# Patient Record
Sex: MALE | Race: BLACK OR AFRICAN AMERICAN | Employment: FULL TIME | URBAN - METROPOLITAN AREA
[De-identification: names, ages, dates, MRNs, and addresses within clinical notes are randomized per-mention and may not be internally consistent; named-entity substitution may affect disease eponyms.]

---

## 2018-11-14 ENCOUNTER — TELEPHONE (OUTPATIENT)
Dept: SURGERY | Facility: CLINIC | Age: 43
End: 2018-11-14

## 2018-11-14 NOTE — TELEPHONE ENCOUNTER
"[facility] Call Center    Phone Message    May a detailed message be left on voicemail: yes    Reason for Call: Other: Patient called in as his fistula \"isnt right\".  He is concerned about fluid being backed up and causing problems like in the past.  He thinks he needs another surgery to correct it.  Please call asap as he wants to be seen asap.      Action Taken: Message routed to:  [facility] ([facility]): brenda    "

## 2018-11-16 NOTE — TELEPHONE ENCOUNTER
Patient states back in 2013 he had a Seton placed and now it has broken and is falling out. Patient states there is still drainage coming out of the Seton but the Seton doesn't feel secure anymore. Patient states there does not appear to be any drainage build up around the seton area. Patient states last time his Seton fell out he got an infection relatively quickly. Patient states he is in [place] now but is on his way home now. Patient will be back and able to come in on Tuesday. Patient was given an appointment on Tuesday 11/20 at 2:00 pm. Patient stated understanding of appointment date, time, and location. Patient is in agreement with this plan of care. Patient's questions and concerns were addressed to his stated satisfaction. Patient will callback directly with further questions or concerns.

## 2018-11-20 ENCOUNTER — OFFICE VISIT (OUTPATIENT)
Dept: SURGERY | Facility: CLINIC | Age: 43
End: 2018-11-20
Payer: OTHER MISCELLANEOUS

## 2018-11-20 VITALS
SYSTOLIC BLOOD PRESSURE: 141 MMHG | WEIGHT: 221.7 LBS | HEART RATE: 70 BPM | TEMPERATURE: 99.3 F | OXYGEN SATURATION: 100 % | DIASTOLIC BLOOD PRESSURE: 91 MMHG | BODY MASS INDEX: 32.84 KG/M2 | HEIGHT: 69 IN

## 2018-11-20 DIAGNOSIS — K60.30 ANAL FISTULA: Primary | ICD-10-CM

## 2018-11-20 NOTE — MR AVS SNAPSHOT
"              After Visit Summary   2018    Herson Barlow    MRN: [id number]           Patient Information     Date Of Birth          1975        Visit Information        Provider Department      2018 2:00 PM Althea Boateng, MARIANGEL CNP [facility] Colon and Rectal Surgery        Today's Diagnoses     Anal fistula    -  1       Follow-ups after your visit        Who to contact     Please call your clinic at [phone] to:    Ask questions about your health    Make or cancel appointments    Discuss your medicines    Learn about your test results    Speak to your doctor            Additional Information About Your Visit        MyChart Information     DrFirst is an electronic gateway that provides easy, online access to your medical records. With DrFirst, you can request a clinic appointment, read your test results, renew a prescription or communicate with your care team.     To sign up for DrFirst visit the website at [email or web address]   You will be asked to enter the access code listed below, as well as some personal information. Please follow the directions to create your username and password.     Your access code is: [email or web address]  Expires: 2019  6:30 AM     Your access code will  in 90 days. If you need help or a new code, please contact your [facility] or call [phone] for assistance.        Care EveryWhere ID     This is your Care EveryWhere ID. This could be used by other organizations to access your [facility] medical records  [id number]        Your Vitals Were     Pulse Temperature Height Pulse Oximetry BMI (Body Mass Index)       70 99.3  F (37.4  C) (Oral) 5' 9\" 100% 32.74 kg/m2        Blood Pressure from Last 3 Encounters:   18 (!) 141/91   13 138/87   01/15/13 134/86    Weight from Last 3 Encounters:   18 221 lb 11.2 oz   13 214 lb 15.2 oz   01/15/13 210 lb              We Performed the " Following     ANOSCOPY W/WO BRUSH/WASH        Primary Care Provider Office Phone # Fax #    [facility] [phone]       [address] [place] [address]        Equal Access to Services     KULWANT DANGELO : Hadii aad ku hadjosh Soarthur, waaxda luqadaha, qaybta kaalmada adelexie, radha hudson su joyce. So [facility] [phone].    ATENCIÓN: Si habla español, tiene a diaz disposición servicios gratuitos de asistencia lingüística. Llame al [phone].    We comply with applicable federal civil rights laws and [place] laws. We do not discriminate on the basis of race, color, national origin, age, disability, sex, sexual orientation, or gender identity.            Thank you!     Thank you for choosing [facility] COLON AND RECTAL SURGERY  for your care. Our goal is always to provide you with excellent care. Hearing back from our patients is one way we can continue to improve our services. Please take a few minutes to complete the written survey that you may receive in the mail after your visit with us. Thank you!             Your Updated Medication List - Protect others around you: Learn how to safely use, store and throw away your medicines at www.disposemymeds.org.          This list is accurate as of 11/20/18  3:43 PM.  Always use your most recent med list.                   Brand Name Dispense Instructions for use Diagnosis    ibuprofen 600 MG tablet    ADVIL/MOTRIN    50 tablet    Take 1 tablet by mouth every 6 hours as needed for other (For mild pain or temperature greater than 102F).    Anal fistula       lidocaine (viscous) 2 % solution    XYLOCAINE    100 mL    Take 3 mLs by mouth every 4 hours as needed for pain (apply to anus for pain.). swish and spit every 3-8 hours as needed; max 8 doses/24 hour period    Anal fistula       NO ACTIVE MEDICATIONS           oxyCODONE-acetaminophen 5-325 MG per tablet    PERCOCET    30 tablet    Take 1-2 tablets by  mouth every 4 hours as needed for pain.    Anal fistula       senna-docusate 8.6-50 MG per tablet    SENOKOT-S;PERICOLACE    30 tablet    Take 1-2 tablets by mouth 2 times daily as needed for constipation.    Anal fistula

## 2018-11-20 NOTE — PROGRESS NOTES
Colon and Rectal Surgery Consult Clinic Note    Date: 2018     Referring provider:  Althea Boateng, MARIANGEL CNP  [address]  [place] [address]     RE: Herson Barlow  : 1975  MELISSA: 2018    Herson Barlow is a very pleasant 43 year old male presents today for broken seton.    Herson developed a severe perianal sepsis drained by Dr. Olvera in .  This reoccurred in 2012 and an MRI at that time showed a complex left-sided fistula.  It he subsequently underwent examination under anesthesia with fistulotomy and seton placement on 2013 with Dr. Moreland.  He reports that he had been doing well.  He has never returned to clinic for any recheck as he has been fairly asymptomatic.  However, he was in [place] over the weekend and his seton broke.  He believes it is still in place but would like this replaced.  He continues to have drainage around his seton and this has not changed.  He denies any pain.  He denies any fevers or chills.    Assessment/Plan: On exam seton appears very fragile and has broke but is still in place in the fistula tract.  I attempted to replace the seton in clinic.  However, when the threading the new seton through the tract, the old seton broke again so I was unable to replace the seton.  He will require examination under anesthesia for placement.  However, discussed that if he develops any increasing pain before he is able to get into surgery to have this replaced, to notify the clinic right away.  He has never returned to discuss if a fistulotomy is possible, but states that he is not interested in having any procedures for the fistula tract and would prefer to leave a seton in place as this does not bother him.  I recommended that he return to clinic about once a year to have this checked and retied or replaced if needed to try to avoid further setons breaking. Patient's questions were answered to his stated satisfaction  and he is in agreement with this plan.    Medical history:  Past Medical History:   Diagnosis Date     External hemorrhoids with complication      Tobacco use disorder     Smokes 1 to 2 cigs/day. Started at 26 years       Surgical history:  Past Surgical History:   Procedure Laterality Date     FISTULOTOMY RECTUM  1/23/2013    Procedure: FISTULOTOMY RECTUM;  Rectal Exam, Fistulotomy, Seton Placement;  Surgeon: Bird Moreland MD;  Location: [facility] OR     HC HEMORRHOIDECTOMY,INT/EXT,COMPLX  9/21/09     HEMORRHOID SURGERY  2009     INCISION AND DRAINAGE PERINEAL, COMBINED  6/29/2012    Procedure: COMBINED INCISION AND DRAINAGE PERINEAL;  Incision and drainage of buttocks wound with wash out  of buttocks wound;  Surgeon: Jessica Olvera MD;  Location: [facility] OR     INCISION AND DRAINAGE RECTUM, COMBINED  12/25/2012    Procedure: COMBINED INCISION AND DRAINAGE RECTUM;  Exam Under Anesthesia, drainage of deep post anal abcess;  Surgeon: Bird Moreland MD;  Location: [facility] OR     IRRIGATION AND DEBRIDEMENT RECTUM, COMBINED  6/27/2012    Procedure: COMBINED IRRIGATION AND DEBRIDEMENT RECTUM;  Irrigation and Debridement Rectal Abscess;  Surgeon: Jessica Olvera MD;  Location: [facility] OR       Problem list:  Patient Active Problem List    Diagnosis Date Noted     Perirectal abscess 06/27/2012     Priority: Medium     CARDIOVASCULAR SCREENING; LDL GOAL LESS THAN 160 05/09/2010     Priority: Medium     External hemorrhoids with complication      Priority: Medium     Tobacco use disorder      Priority: Medium     Smokes 6 to 7 cigs/day. Started at 26 years         Medications:  Current Outpatient Prescriptions   Medication Sig Dispense Refill     ibuprofen (ADVIL,MOTRIN) 600 MG tablet Take 1 tablet by mouth every 6 hours as needed for other (For mild pain or temperature greater than 102F). 50 tablet 0     lidocaine (XYLOCAINE) 2 % solution Take 3 mLs by mouth every 4 hours as needed for pain (apply to anus for pain.).  "swish and spit every 3-8 hours as needed; max 8 doses/24 hour period (Patient not taking: Reported on 11/20/2018) 100 mL 0     NO ACTIVE MEDICATIONS        oxyCODONE-acetaminophen (PERCOCET) 5-325 MG per tablet Take 1-2 tablets by mouth every 4 hours as needed for pain. (Patient not taking: Reported on 11/20/2018) 30 tablet 0     senna-docusate (SENOKOT-S;PERICOLACE) 8.6-50 MG per tablet Take 1-2 tablets by mouth 2 times daily as needed for constipation. (Patient not taking: Reported on 11/20/2018) 30 tablet 0       Allergies:  Allergies   Allergen Reactions     Ivp Dye [Contrast Dye]        Family history:  Family History   Problem Relation Age of Onset     Diabetes Brother      Diabetes Brother        Social history:  Social History   Substance Use Topics     Smoking status: Light Tobacco Smoker     Types: Cigarettes     Smokeless tobacco: Never Used      Comment: 1 to 2  cigarettes/day. 2nd hand smoke exposure from roommates.     Alcohol use No    Marital status: single.    Nursing Notes:   Veronica Velazquez CMA  11/20/2018  1:31 PM  Signed  Chief Complaint   Patient presents with     Consult     Rectal Problem     RECHECK       Vitals:    11/20/18 1327   BP: (!) 141/91   BP Location: Left arm   Patient Position: Sitting   Cuff Size: Adult Regular   Pulse: 70   Temp: 99.3  F (37.4  C)   TempSrc: Oral   SpO2: 100%   Weight: 221 lb 11.2 oz   Height: 5' 9\"       Body mass index is 32.74 kg/(m^2).      DENIZ Cadena                         Physical Examination: Exam was chaperoned by DENIZ Cadena   BP (!) 141/91 (BP Location: Left arm, Patient Position: Sitting, Cuff Size: Adult Regular)  Pulse 70  Temp 99.3  F (37.4  C) (Oral)  Ht 5' 9\"  Wt 221 lb 11.2 oz  SpO2 100%  BMI 32.74 kg/m2  General: Alert, oriented, in no acute distress, sitting comfortably  HEENT: Mucous membranes moist  Perianal external examination:  Yellow vessel loop seton in place with external opening in the posterior " midline. This broke but is still in place. Attempted to replace seton but old seton broke while rotating through the tract.  Digital rectal examination: Was performed.   Sphincter tone: Good.  Palpable lesions: No.  Prostate: Normal.  Other: None.    Anoscopy: Was performed.   Hemorrhoids: No significant internal hemorrhoids.  Lesions: internal fistula opening in the posterior midline    Total face to face time was 20 minutes, >50% counseling.    MARIANGEL Bess, NP-C  Colon and Rectal Surgery   [facility]    This note was created using speech recognition software and may contain unintended word substitutions.

## 2018-11-20 NOTE — NURSING NOTE
"Chief Complaint   Patient presents with     Consult     Rectal Problem     RECHECK       Vitals:    11/20/18 1327   BP: (!) 141/91   BP Location: Left arm   Patient Position: Sitting   Cuff Size: Adult Regular   Pulse: 70   Temp: 99.3  F (37.4  C)   TempSrc: Oral   SpO2: 100%   Weight: 221 lb 11.2 oz   Height: 5' 9\"       Body mass index is 32.74 kg/(m^2).      Veronica Velazquez, EMT                      "

## 2018-11-20 NOTE — LETTER
2018       RE: Herson Barlow  [address]  [facility] [address]     Dear Colleague,    Thank you for referring your patient, Herson Barlow, to the [facility] COLON AND RECTAL SURGERY at [facility] Please see a copy of my visit note below.    Colon and Rectal Surgery Consult Clinic Note    Date: 2018     Referring provider:  MARIANGEL Sal CNP  [address] [facility] [place] [address]     RE: Herson Barlow  : 1975  MELISSA: 2018    Herson Barlow is a very pleasant 43 year old male presents today for broken seton.    Herson developed a severe perianal sepsis drained by Dr. Olvera in .  This reoccurred in 2012 and an MRI at that time showed a complex left-sided fistula.  It he subsequently underwent examination under anesthesia with fistulotomy and seton placement on 2013 with Dr. Moreland.  He reports that he had been doing well.  He has never returned to clinic for any recheck as he has been fairly asymptomatic.  However, he was in [place] over the weekend and his seton broke.  He believes it is still in place but would like this replaced.  He continues to have drainage around his seton and this has not changed.  He denies any pain.  He denies any fevers or chills.    Assessment/Plan: On exam seton appears very fragile and has broke but is still in place in the fistula tract.  I attempted to replace the seton in clinic.  However, when the threading the new seton through the tract, the old seton broke again so I was unable to replace the seton.  He will require examination under anesthesia for placement.  However, discussed that if he develops any increasing pain before he is able to get into surgery to have this replaced, to notify the clinic right away.  He has never returned to discuss if a fistulotomy is possible, but states that he is not interested in having any procedures for the  fistula tract and would prefer to leave a seton in place as this does not bother him.  I recommended that he return to clinic about once a year to have this checked and retied or replaced if needed to try to avoid further setons breaking. Patient's questions were answered to his stated satisfaction and he is in agreement with this plan.    Medical history:  Past Medical History:   Diagnosis Date     External hemorrhoids with complication      Tobacco use disorder     Smokes 1 to 2 cigs/day. Started at 26 years       Surgical history:  Past Surgical History:   Procedure Laterality Date     FISTULOTOMY RECTUM  1/23/2013    Procedure: FISTULOTOMY RECTUM;  Rectal Exam, Fistulotomy, Seton Placement;  Surgeon: Bird Moreland MD;  Location: [facility]     HC HEMORRHOIDECTOMY,INT/EXT,COMPLX  9/21/09     HEMORRHOID SURGERY  2009     INCISION AND DRAINAGE PERINEAL, COMBINED  6/29/2012    Procedure: COMBINED INCISION AND DRAINAGE PERINEAL;  Incision and drainage of buttocks wound with wash out  of buttocks wound;  Surgeon: Jessica Olvera MD;  Location: [facility] OR     INCISION AND DRAINAGE RECTUM, COMBINED  12/25/2012    Procedure: COMBINED INCISION AND DRAINAGE RECTUM;  Exam Under Anesthesia, drainage of deep post anal abcess;  Surgeon: Bird Moreland MD;  Location: [facility] OR     IRRIGATION AND DEBRIDEMENT RECTUM, COMBINED  6/27/2012    Procedure: COMBINED IRRIGATION AND DEBRIDEMENT RECTUM;  Irrigation and Debridement Rectal Abscess;  Surgeon: Jessica Olvera MD;  [facility]       Problem list:  Patient Active Problem List    Diagnosis Date Noted     Perirectal abscess 06/27/2012     Priority: Medium     CARDIOVASCULAR SCREENING; LDL GOAL LESS THAN 160 05/09/2010     Priority: Medium     External hemorrhoids with complication      Priority: Medium     Tobacco use disorder      Priority: Medium     Smokes 6 to 7 cigs/day. Started at 26 years         Medications:  Current Outpatient Prescriptions   Medication Sig  "Dispense Refill     ibuprofen (ADVIL,MOTRIN) 600 MG tablet Take 1 tablet by mouth every 6 hours as needed for other (For mild pain or temperature greater than 102F). 50 tablet 0     lidocaine (XYLOCAINE) 2 % solution Take 3 mLs by mouth every 4 hours as needed for pain (apply to anus for pain.). swish and spit every 3-8 hours as needed; max 8 doses/24 hour period (Patient not taking: Reported on 11/20/2018) 100 mL 0     NO ACTIVE MEDICATIONS        oxyCODONE-acetaminophen (PERCOCET) 5-325 MG per tablet Take 1-2 tablets by mouth every 4 hours as needed for pain. (Patient not taking: Reported on 11/20/2018) 30 tablet 0     senna-docusate (SENOKOT-S;PERICOLACE) 8.6-50 MG per tablet Take 1-2 tablets by mouth 2 times daily as needed for constipation. (Patient not taking: Reported on 11/20/2018) 30 tablet 0       Allergies:  Allergies   Allergen Reactions     Ivp Dye [Contrast Dye]        Family history:  Family History   Problem Relation Age of Onset     Diabetes Brother      Diabetes Brother        Social history:  Social History   Substance Use Topics     Smoking status: Light Tobacco Smoker     Types: Cigarettes     Smokeless tobacco: Never Used      Comment: 1 to 2  cigarettes/day. 2nd hand smoke exposure from roommates.     Alcohol use No    Marital status: single.    Nursing Notes:   Veronica Velazquez CMA  11/20/2018  1:31 PM  Signed  Chief Complaint   Patient presents with     Consult     Rectal Problem     RECHECK       Vitals:    11/20/18 1327   BP: (!) 141/91   BP Location: Left arm   Patient Position: Sitting   Cuff Size: Adult Regular   Pulse: 70   Temp: 99.3  F (37.4  C)   TempSrc: Oral   SpO2: 100%   Weight: 221 lb 11.2 oz   Height: 5' 9\"       Body mass index is 32.74 kg/(m^2).      DENIZ Cadena         Physical Examination: Exam was chaperoned by DENIZ Cadena   BP (!) 141/91 (BP Location: Left arm, Patient Position: Sitting, Cuff Size: Adult Regular)  Pulse 70  Temp 99.3  F (37.4  C) " "(Oral)  Ht 5' 9\"  Wt 221 lb 11.2 oz  SpO2 100%  BMI 32.74 kg/m2  General: Alert, oriented, in no acute distress, sitting comfortably  HEENT: Mucous membranes moist  Perianal external examination:  Yellow vessel loop seton in place with external opening in the posterior midline. This broke but is still in place. Attempted to replace seton but old seton broke while rotating through the tract.  Digital rectal examination: Was performed.   Sphincter tone: Good.  Palpable lesions: No.  Prostate: Normal.  Other: None.    Anoscopy: Was performed.   Hemorrhoids: No significant internal hemorrhoids.  Lesions: internal fistula opening in the posterior midline    Total face to face time was 20 minutes, >50% counseling.    This note was created using speech recognition software and may contain unintended word substitutions.      MARIANGEL Urena CNP      "

## 2018-11-21 ENCOUNTER — TELEPHONE (OUTPATIENT)
Dept: SURGERY | Facility: CLINIC | Age: 43
End: 2018-11-21

## 2018-11-21 DIAGNOSIS — K60.30 ANAL FISTULA: Primary | ICD-10-CM

## 2018-11-21 NOTE — TELEPHONE ENCOUNTER
----- Message from MARIANGEL Sal CNP sent at 11/20/2018  3:38 PM CST -----  Please set him up for examination under anesthesia and seton placement. Thanks!  E

## 2018-11-21 NOTE — TELEPHONE ENCOUNTER
Patient left a message returning my call.  Informed patient that I just had a cancellation for 12/7/18, and patient confirms he would like that date for procedure.    Patient is scheduled for surgery with Dr. Moreland      Date of Surgery: 12/7/18    [facility]  [address] [place] [id number]    Informed patient they will need an adult  Yes    Pre-op with surgeon (if applicable): n/a    H&P: Scheduled with local physician    Surgery packet: email

## 2018-11-21 NOTE — TELEPHONE ENCOUNTER
Called patient to schedule OR procedure.  Left message offering 12/13/18 versus 12/21/18.  Requested a call back to confirm. Provided my direct number.

## 2018-12-06 ENCOUNTER — ANESTHESIA EVENT (OUTPATIENT)
Dept: SURGERY | Facility: AMBULATORY SURGERY CENTER | Age: 43
End: 2018-12-06

## 2018-12-07 ENCOUNTER — HOSPITAL ENCOUNTER (OUTPATIENT)
Facility: AMBULATORY SURGERY CENTER | Age: 43
End: 2018-12-07
Attending: COLON & RECTAL SURGERY
Payer: COMMERCIAL

## 2018-12-07 ENCOUNTER — ANESTHESIA (OUTPATIENT)
Dept: SURGERY | Facility: AMBULATORY SURGERY CENTER | Age: 43
End: 2018-12-07

## 2018-12-07 VITALS
OXYGEN SATURATION: 95 % | HEART RATE: 96 BPM | SYSTOLIC BLOOD PRESSURE: 105 MMHG | DIASTOLIC BLOOD PRESSURE: 74 MMHG | RESPIRATION RATE: 14 BRPM | TEMPERATURE: 98.5 F

## 2018-12-07 RX ORDER — DEXAMETHASONE SODIUM PHOSPHATE 4 MG/ML
INJECTION, SOLUTION INTRA-ARTICULAR; INTRALESIONAL; INTRAMUSCULAR; INTRAVENOUS; SOFT TISSUE PRN
Status: DISCONTINUED | OUTPATIENT
Start: 2018-12-07 | End: 2018-12-07

## 2018-12-07 RX ORDER — GLYCOPYRROLATE 0.2 MG/ML
INJECTION, SOLUTION INTRAMUSCULAR; INTRAVENOUS PRN
Status: DISCONTINUED | OUTPATIENT
Start: 2018-12-07 | End: 2018-12-07

## 2018-12-07 RX ORDER — PROPOFOL 10 MG/ML
INJECTION, EMULSION INTRAVENOUS CONTINUOUS PRN
Status: DISCONTINUED | OUTPATIENT
Start: 2018-12-07 | End: 2018-12-07

## 2018-12-07 RX ORDER — SODIUM CHLORIDE, SODIUM LACTATE, POTASSIUM CHLORIDE, CALCIUM CHLORIDE 600; 310; 30; 20 MG/100ML; MG/100ML; MG/100ML; MG/100ML
INJECTION, SOLUTION INTRAVENOUS CONTINUOUS
Status: DISCONTINUED | OUTPATIENT
Start: 2018-12-07 | End: 2018-12-08 | Stop reason: HOSPADM

## 2018-12-07 RX ORDER — GABAPENTIN 300 MG/1
300 CAPSULE ORAL ONCE
Status: COMPLETED | OUTPATIENT
Start: 2018-12-07 | End: 2018-12-07

## 2018-12-07 RX ORDER — HYDROMORPHONE HYDROCHLORIDE 1 MG/ML
.3-.5 INJECTION, SOLUTION INTRAMUSCULAR; INTRAVENOUS; SUBCUTANEOUS EVERY 10 MIN PRN
Status: DISCONTINUED | OUTPATIENT
Start: 2018-12-07 | End: 2018-12-08 | Stop reason: HOSPADM

## 2018-12-07 RX ORDER — FENTANYL CITRATE 50 UG/ML
INJECTION, SOLUTION INTRAMUSCULAR; INTRAVENOUS PRN
Status: DISCONTINUED | OUTPATIENT
Start: 2018-12-07 | End: 2018-12-07

## 2018-12-07 RX ORDER — ONDANSETRON 4 MG/1
4 TABLET, ORALLY DISINTEGRATING ORAL
Status: CANCELLED | OUTPATIENT
Start: 2018-12-07

## 2018-12-07 RX ORDER — KETOROLAC TROMETHAMINE 30 MG/ML
INJECTION, SOLUTION INTRAMUSCULAR; INTRAVENOUS PRN
Status: DISCONTINUED | OUTPATIENT
Start: 2018-12-07 | End: 2018-12-07

## 2018-12-07 RX ORDER — ONDANSETRON 4 MG/1
4 TABLET, ORALLY DISINTEGRATING ORAL EVERY 30 MIN PRN
Status: DISCONTINUED | OUTPATIENT
Start: 2018-12-07 | End: 2018-12-08 | Stop reason: HOSPADM

## 2018-12-07 RX ORDER — ONDANSETRON 2 MG/ML
INJECTION INTRAMUSCULAR; INTRAVENOUS PRN
Status: DISCONTINUED | OUTPATIENT
Start: 2018-12-07 | End: 2018-12-07

## 2018-12-07 RX ORDER — CEFAZOLIN SODIUM 1 G/3ML
INJECTION, POWDER, FOR SOLUTION INTRAMUSCULAR; INTRAVENOUS PRN
Status: DISCONTINUED | OUTPATIENT
Start: 2018-12-07 | End: 2018-12-07

## 2018-12-07 RX ORDER — NALOXONE HYDROCHLORIDE 0.4 MG/ML
.1-.4 INJECTION, SOLUTION INTRAMUSCULAR; INTRAVENOUS; SUBCUTANEOUS
Status: DISCONTINUED | OUTPATIENT
Start: 2018-12-07 | End: 2018-12-08 | Stop reason: HOSPADM

## 2018-12-07 RX ORDER — ONDANSETRON 2 MG/ML
4 INJECTION INTRAMUSCULAR; INTRAVENOUS EVERY 30 MIN PRN
Status: DISCONTINUED | OUTPATIENT
Start: 2018-12-07 | End: 2018-12-08 | Stop reason: HOSPADM

## 2018-12-07 RX ORDER — MEPERIDINE HYDROCHLORIDE 25 MG/ML
12.5 INJECTION INTRAMUSCULAR; INTRAVENOUS; SUBCUTANEOUS
Status: DISCONTINUED | OUTPATIENT
Start: 2018-12-07 | End: 2018-12-08 | Stop reason: HOSPADM

## 2018-12-07 RX ORDER — FENTANYL CITRATE 50 UG/ML
25-50 INJECTION, SOLUTION INTRAMUSCULAR; INTRAVENOUS
Status: DISCONTINUED | OUTPATIENT
Start: 2018-12-07 | End: 2018-12-08 | Stop reason: HOSPADM

## 2018-12-07 RX ORDER — BUPIVACAINE HYDROCHLORIDE AND EPINEPHRINE 2.5; 5 MG/ML; UG/ML
INJECTION, SOLUTION INFILTRATION; PERINEURAL PRN
Status: DISCONTINUED | OUTPATIENT
Start: 2018-12-07 | End: 2018-12-07 | Stop reason: HOSPADM

## 2018-12-07 RX ORDER — ACETAMINOPHEN 325 MG/1
975 TABLET ORAL ONCE
Status: COMPLETED | OUTPATIENT
Start: 2018-12-07 | End: 2018-12-07

## 2018-12-07 RX ORDER — OXYCODONE HYDROCHLORIDE 5 MG/1
5 TABLET ORAL EVERY 4 HOURS PRN
Status: DISCONTINUED | OUTPATIENT
Start: 2018-12-07 | End: 2018-12-08 | Stop reason: HOSPADM

## 2018-12-07 RX ORDER — LABETALOL HYDROCHLORIDE 5 MG/ML
INJECTION, SOLUTION INTRAVENOUS PRN
Status: DISCONTINUED | OUTPATIENT
Start: 2018-12-07 | End: 2018-12-07

## 2018-12-07 RX ADMIN — GABAPENTIN 300 MG: 300 CAPSULE ORAL at 10:53

## 2018-12-07 RX ADMIN — ACETAMINOPHEN 975 MG: 325 TABLET ORAL at 10:53

## 2018-12-07 RX ADMIN — KETOROLAC TROMETHAMINE 30 MG: 30 INJECTION, SOLUTION INTRAMUSCULAR; INTRAVENOUS at 13:01

## 2018-12-07 RX ADMIN — SODIUM CHLORIDE, SODIUM LACTATE, POTASSIUM CHLORIDE, CALCIUM CHLORIDE: 600; 310; 30; 20 INJECTION, SOLUTION INTRAVENOUS at 12:21

## 2018-12-07 RX ADMIN — FENTANYL CITRATE 50 MCG: 50 INJECTION, SOLUTION INTRAMUSCULAR; INTRAVENOUS at 12:32

## 2018-12-07 RX ADMIN — GLYCOPYRROLATE 0.2 MG: 0.2 INJECTION, SOLUTION INTRAMUSCULAR; INTRAVENOUS at 12:39

## 2018-12-07 RX ADMIN — ONDANSETRON 4 MG: 2 INJECTION INTRAMUSCULAR; INTRAVENOUS at 12:29

## 2018-12-07 RX ADMIN — DEXAMETHASONE SODIUM PHOSPHATE 4 MG: 4 INJECTION, SOLUTION INTRA-ARTICULAR; INTRALESIONAL; INTRAMUSCULAR; INTRAVENOUS; SOFT TISSUE at 12:29

## 2018-12-07 RX ADMIN — FENTANYL CITRATE 50 MCG: 50 INJECTION, SOLUTION INTRAMUSCULAR; INTRAVENOUS at 12:33

## 2018-12-07 RX ADMIN — CEFAZOLIN SODIUM 2 G: 1 INJECTION, POWDER, FOR SOLUTION INTRAMUSCULAR; INTRAVENOUS at 12:29

## 2018-12-07 RX ADMIN — LABETALOL HYDROCHLORIDE 5 MG: 5 INJECTION, SOLUTION INTRAVENOUS at 12:57

## 2018-12-07 RX ADMIN — PROPOFOL 100 MCG/KG/MIN: 10 INJECTION, EMULSION INTRAVENOUS at 12:25

## 2018-12-07 NOTE — ANESTHESIA CARE TRANSFER NOTE
Patient: Herson Barlow    Procedure(s):  EXAM UNDER ANESTHESIA ANUS  PLACEMENT OF SETON RECTUM    Diagnosis: Anal Fistula  Diagnosis Additional Information: No value filed.    Anesthesia Type:   MAC     Note:  Airway :Room Air  Patient transferred to:Phase II  Comments: Patient awake and breathing spont. VSS. No complaints of pain or nausea. Report to RNHandoff Report: Identifed the Patient, Identified the Reponsible Provider, Reviewed the pertinent medical history, Discussed the surgical course, Reviewed Intra-OP anesthesia mangement and issues during anesthesia, Set expectations for post-procedure period and Allowed opportunity for questions and acknowledgement of understanding      Vitals: (Last set prior to Anesthesia Care Transfer)    [facility] VITALS  12/7/2018 1238 - 12/7/2018 1313      12/7/2018             EKG: NSR                Electronically Signed By: MARIANGEL Pinon CRNA  December 7, 2018  1:13 PM

## 2018-12-07 NOTE — ANESTHESIA POSTPROCEDURE EVALUATION
Anesthesia POST Procedure Evaluation    Patient: Herson Barlow   MRN:     [id number] Gender:   male   Age:    43 year old :      1975        Preoperative Diagnosis: Anal Fistula   Procedure(s):  EXAM UNDER ANESTHESIA ANUS  PLACEMENT OF SETON RECTUM   Postop Comments: No value filed.       Anesthesia Type:  Not documented    Reportable Event: NO     PAIN: Uncomplicated   Sign Out status: Comfortable, Well controlled pain     PONV: No PONV   Sign Out status:  No Nausea or Vomiting     Neuro/Psych: Uneventful perioperative course   Sign Out Status: Preoperative baseline; Age appropriate mentation     Airway/Resp.: Uneventful perioperative course   Sign Out Status: Non labored breathing, age appropriate RR; Resp. Status within EXPECTED Parameters     CV: Uneventful perioperative course   Sign Out status: Appropriate BP and perfusion indices; Appropriate HR/Rhythm     Disposition:   Sign Out in:  Phase II  Disposition:  Home  Recovery Course: Uneventful  Follow-Up: Not required           Last Anesthesia Record Vitals:  CR[facility] VITALS  2018 1238 - 2018 1338      2018             EKG: NSR          Last PACU/Preop Vitals:  Vitals:    18 1035 18 1312 18 1327   BP: 144/89 100/71 105/74   Pulse:  96 96   Resp: 18 14 14   Temp: 36.8  C (98.2  F) 36.9  C (98.5  F) 36.9  C (98.5  F)   SpO2: 98% 95% 95%         Electronically Signed By: Maksim Pacheco MD, 2018, 2:21 PM

## 2018-12-07 NOTE — ANESTHESIA PREPROCEDURE EVALUATION
Anesthesia Pre-Procedure Evaluation    Patient: Herson Barlow   MRN:     [id number] Gender:   male   Age:    43 year old :      1975        Preoperative Diagnosis: Anal Fistula   Procedure(s):  EXAM UNDER ANESTHESIA ANUS  PLACEMENT OF SETON RECTUM     Past Medical History:   Diagnosis Date     External hemorrhoids with complication      Tobacco use disorder     Smokes 1 to 2 cigs/day. Started at 26 years      Past Surgical History:   Procedure Laterality Date     FISTULOTOMY RECTUM  2013    Procedure: FISTULOTOMY RECTUM;  Rectal Exam, Fistulotomy, Seton Placement;  Surgeon: Bird Moreland MD;  Location: [facility] OR     HC HEMORRHOIDECTOMY,INT/EXT,COMPLX  09     HEMORRHOID SURGERY  2009     INCISION AND DRAINAGE PERINEAL, COMBINED  2012    Procedure: COMBINED INCISION AND DRAINAGE PERINEAL;  Incision and drainage of buttocks wound with wash out  of buttocks wound;  Surgeon: Jessica Olvera MD;  Location: [facility] OR     INCISION AND DRAINAGE RECTUM, COMBINED  2012    Procedure: COMBINED INCISION AND DRAINAGE RECTUM;  Exam Under Anesthesia, drainage of deep post anal abcess;  Surgeon: Bird Moreland MD;  Location: [facility]     IRRIGATION AND DEBRIDEMENT RECTUM, COMBINED  2012    Procedure: COMBINED IRRIGATION AND DEBRIDEMENT RECTUM;  Irrigation and Debridement Rectal Abscess;  Surgeon: Jessica Olvera MD;  Location: [facility]               [facility] AN PHYSICAL EXAM    Lab Results   Component Value Date    WBC 24.5 (H) 2012    HGB 14.0 2012    HCT 40.2 2012     2012    .4 (H) 2012     2012    POTASSIUM 4.4 2012    CHLORIDE 107 2012    CO2 23 2012    BUN 9 2012    CR 0.93 2012     (H) 2012    ELOY 8.4 (L) 2012    PHOS 6.9 (H) 2012    MAG 2.6 (H) 2012    PTT 36 2012    INR 1.25 (H) 2012       Preop Vitals  BP Readings from Last 3 Encounters:   18  "144/89   11/20/18 (!) 141/91   01/23/13 138/87    Pulse Readings from Last 3 Encounters:   11/20/18 70   01/15/13 83   12/25/12 106      Resp Readings from Last 3 Encounters:   12/07/18 18   01/23/13 16   12/25/12 16    SpO2 Readings from Last 3 Encounters:   12/07/18 98%   11/20/18 100%   01/23/13 100%      Temp Readings from Last 1 Encounters:   12/07/18 36.8  C (98.2  F) (Oral)    Ht Readings from Last 1 Encounters:   11/20/18 1.753 m (5' 9\")      Wt Readings from Last 1 Encounters:   11/20/18 100.6 kg (221 lb 11.2 oz)    Estimated body mass index is 32.74 kg/(m^2) as calculated from the following:    Height as of 11/20/18: 1.753 m (5' 9\").    Weight as of 11/20/18: 100.6 kg (221 lb 11.2 oz).     LDA:  Peripheral IV 12/07/18 Left Hand (Active)   Site Assessment WDL 12/7/2018 11:00 AM   Line Status Infusing 12/7/2018 11:00 AM   Phlebitis Scale 0-->no symptoms 12/7/2018 11:00 AM   Infiltration Scale 0 12/7/2018 11:00 AM   Number of days:0       Open Drain Rectal  (Active)   Number of days:0            Assessment:   ASA SCORE: 2    NPO Status: > 6 hours since completed Solid Foods; > 2 hours since completed Clear Liquids   Documentation: H&P complete; Preop Testing complete; Consents complete   Proceeding: Proceed without further delay  Tobacco Use:  NO Active use of Tobacco/UNKNOWN Tobacco use status     Plan:   Anes. Type:  MAC   Pre-Induction: Midazolam IV   Induction:  IV (Standard)   Airway: Native Airway   Access/Monitoring: PIV   Maintenance: Propofol; IV   Emergence: Procedure Site   Logistics: Same Day Surgery     Postop Pain/Sedation Strategy:  Standard-Options: Opioids PRN     PONV Management:  Adult Risk Factors:, Non-Smoker, Postop Opioids  Prevention: Ondansetron     CONSENT: Direct conversation   Plan and risks discussed with: Patient                          ANESTHESIA PREOP EVALUATION    PROCEDURE: Procedure(s):  EXAM UNDER ANESTHESIA ANUS  PLACEMENT OF SETON RECTUM    HPI: Herson Barlow is a " 43 year old male who presents for above procedure.    PAST MEDICAL HISTORY:    Past Medical History:   Diagnosis Date     External hemorrhoids with complication      Tobacco use disorder     Smokes 1 to 2 cigs/day. Started at 26 years       PAST SURGICAL HISTORY:    Past Surgical History:   Procedure Laterality Date     FISTULOTOMY RECTUM  1/23/2013    Procedure: FISTULOTOMY RECTUM;  Rectal Exam, Fistulotomy, Seton Placement;  Surgeon: Bird Moreland MD;  Location: [facility]     HC HEMORRHOIDECTOMY,INT/EXT,COMPLX  9/21/09     HEMORRHOID SURGERY  2009     INCISION AND DRAINAGE PERINEAL, COMBINED  6/29/2012    Procedure: COMBINED INCISION AND DRAINAGE PERINEAL;  Incision and drainage of buttocks wound with wash out  of buttocks wound;  Surgeon: Jessica Olvera MD;  Location: [facility]     INCISION AND DRAINAGE RECTUM, COMBINED  12/25/2012    Procedure: COMBINED INCISION AND DRAINAGE RECTUM;  Exam Under Anesthesia, drainage of deep post anal abcess;  Surgeon: Bird Moreland MD;  Location: [facility]     IRRIGATION AND DEBRIDEMENT RECTUM, COMBINED  6/27/2012    Procedure: COMBINED IRRIGATION AND DEBRIDEMENT RECTUM;  Irrigation and Debridement Rectal Abscess;  Surgeon: Jessica Olvera MD;  Location: [facility]       PAST ANESTHESIA HISTORY:     No personal or family h/o anesthesia problems    SOCIAL HISTORY:       Social History   Substance Use Topics     Smoking status: Light Tobacco Smoker     Types: Cigarettes     Smokeless tobacco: Never Used      Comment: 1 to 2  cigarettes/day. 2nd hand smoke exposure from roommates.     Alcohol use No       ALLERGIES:     Allergies   Allergen Reactions     Ivp Dye [Contrast Dye]        MEDICATIONS:       (Not in a hospital admission)    Current Outpatient Prescriptions   Medication Sig Dispense Refill     senna-docusate (SENOKOT-S;PERICOLACE) 8.6-50 MG per tablet Take 1-2 tablets by mouth 2 times daily as needed for constipation. 30 tablet 0     ibuprofen (ADVIL,MOTRIN) 600  MG tablet Take 1 tablet by mouth every 6 hours as needed for other (For mild pain or temperature greater than 102F). 50 tablet 0     lidocaine (XYLOCAINE) 2 % solution Take 3 mLs by mouth every 4 hours as needed for pain (apply to anus for pain.). swish and spit every 3-8 hours as needed; max 8 doses/24 hour period (Patient not taking: Reported on 11/20/2018) 100 mL 0     NO ACTIVE MEDICATIONS        oxyCODONE-acetaminophen (PERCOCET) 5-325 MG per tablet Take 1-2 tablets by mouth every 4 hours as needed for pain. (Patient not taking: Reported on 11/20/2018) 30 tablet 0       Current Outpatient Prescriptions Ordered in [facility]   Medication Sig Dispense Refill     senna-docusate (SENOKOT-S;PERICOLACE) 8.6-50 MG per tablet Take 1-2 tablets by mouth 2 times daily as needed for constipation. 30 tablet 0     ibuprofen (ADVIL,MOTRIN) 600 MG tablet Take 1 tablet by mouth every 6 hours as needed for other (For mild pain or temperature greater than 102F). 50 tablet 0     lidocaine (XYLOCAINE) 2 % solution Take 3 mLs by mouth every 4 hours as needed for pain (apply to anus for pain.). swish and spit every 3-8 hours as needed; max 8 doses/24 hour period (Patient not taking: Reported on 11/20/2018) 100 mL 0     NO ACTIVE MEDICATIONS        oxyCODONE-acetaminophen (PERCOCET) 5-325 MG per tablet Take 1-2 tablets by mouth every 4 hours as needed for pain. (Patient not taking: Reported on 11/20/2018) 30 tablet 0     Current Facility-Administered Medications Ordered in Epic   Medication Dose Route Frequency Provider Last Rate Last Dose     ceFAZolin (ANCEF) 1 g vial to attach to  ml bag for ADULT or 50 ml bag for PEDS    PRN Adele Mendez APRN CRNA   2 g at 12/07/18 1229     dexamethasone (DECADRON) injection   Intravenous PRN Adele Mendez APRN CRNA   4 mg at 12/07/18 1229     fentaNYL (PF) (SUBLIMAZE) injection   Intravenous PRN Adele Mendez APRN CRNA   50 mcg at 12/07/18 1233     glycopyrrolate (ROBINUL)  injection    PRN Adele Mendez APRN CRNA   0.2 mg at 12/07/18 1239     ketorolac (TORADOL) injection    PRN Adele Mendez APRN CRNA   30 mg at 12/07/18 1301     labetalol (NORMODYNE/TRANDATE) injection    PRN Adele Mendez APRN CRNA   5 mg at 12/07/18 1257     lactated ringers infusion   Intravenous Continuous Maksim Pacheco MD         lidocaine BUFFERED 1 % injection 1 mL  1 mL Other Q1H PRN Maksim Pacheco MD         midazolam (VERSED) injection   Intravenous PRN Adele Mendez APRN CRNA   2 mg at 12/07/18 1221     ondansetron (ZOFRAN) injection   Intravenous PRN Adele Mendez APRN CRNA   4 mg at 12/07/18 1229     PRE OP antibiotics NOT needed for this surgical procedure  1 each As instructed Continuous Bird Moreland MD         propofol (DIPRIVAN) injection 10 mg/mL vial   Intravenous Continuous PRN Adele Mendez APRN CRNA   Stopped at 12/07/18 1300     sodium chloride (PF) 0.9% PF flush 3 mL  3 mL Intracatheter Q1H PRN Maksim Pacheco MD           PHYSICAL EXAM:    Vitals: T 98.2, P Data Unavailable, /89, R 18, SpO2 98%, Weight   Wt Readings from Last 2 Encounters:   11/20/18 100.6 kg (221 lb 11.2 oz)   01/23/13 97.5 kg (214 lb 15.2 oz)       See doc flowsheet      NPO STATUS: see doc flowsheet    LABS:    BMP:  Recent Labs   Lab Test  12/25/12   1001   NA  141   POTASSIUM  4.4   CHLORIDE  107   CO2  23   BUN  9   CR  0.93   GLC  112*   ELOY  8.4*       LFTs:   No results for input(s): PROTTOTAL, ALBUMIN, BILITOTAL, ALKPHOS, AST, ALT, BILIDIRECT in the last 53949 hours.    CBC:   Recent Labs   Lab Test  12/25/12   1001   WBC  24.5*   RBC  4.30*   HGB  14.0   HCT  40.2   MCV  94   MCH  32.6   MCHC  34.8   RDW  13.1   PLT  283       Coags:  Recent Labs   Lab Test  06/27/12   0620   INR  1.25*   PTT  36       Imaging:  No orders to display       Maksim Pacheco MD  Anesthesiology Staff  Pager [phone]    12/7/2018      Maksim Pacheco MD

## 2018-12-07 NOTE — IP AVS SNAPSHOT
[facility]    [address] [place] [id number]    Phone:  [phone]    Fax:  [phone]                                       After Visit Summary   12/7/2018    Herson Barlow    MRN: [id number]           After Visit Summary Signature Page     I have received my discharge instructions, and my questions have been answered. I have discussed any challenges I see with this plan with the nurse or doctor.    ..........................................................................................................................................  Patient/Patient Representative Signature      ..........................................................................................................................................  Patient Representative Print Name and Relationship to Patient    ..................................................               ................................................  Date                                   Time    ..........................................................................................................................................  Reviewed by Signature/Title    ...................................................              ..............................................  Date                                               Time          22EPIC Rev 08/18

## 2018-12-07 NOTE — BRIEF OP NOTE
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  Complications: None.  Implants: None.      Martha Portillo PGY1  Colorectal Surgery

## 2018-12-07 NOTE — IP AVS SNAPSHOT
MRN:[id number]                      After Visit Summary   12/7/2018    Herson Barlow    MRN: [id number]           Thank you!     Thank you for choosing [facility] for your care. Our goal is always to provide you with excellent care. Hearing back from our patients is one way we can continue to improve our services. Please take a few minutes to complete the written survey that you may receive in the mail after you visit with us. Thank you!        Patient Information     Date Of Birth          1975        About your hospital stay     You were admitted on:  December 7, 2018 [facility]    You were discharged on:  December 7, 2018       Who to Call     For medical emergencies, please call 911.  For non-urgent questions about your medical care, please call your primary care provider or clinic, [phone]  For questions related to your surgery, please call your surgery clinic        Attending Provider     Provider Specialty    Bird Moreland MD Colon and Rectal Surgery       Primary Care Provider Office Phone # Fax #    [facility] [phone]      Further instructions from your care team       Anorectal Surgery Instructions    What can I expect after anorectal surgery?  Most anorectal procedures are done as outpatient surgery, and you go home the same day as the procedure. A few surgical procedures will require that you stay in the hospital for about one to three days. No matter where the procedure is done or how long or short it takes, these recommendations will help you heal and feel more comfortable.    Medicines:  The anal area is very sensitive; you can expect to have some pain for up to 2-4 weeks after the procedure. Your doctor will give you a prescription for one or more pain medications.    Take naprosyn 500 mg twice a day OR ibuprofen 600 mg four times a day     Take this on a regular basis (not as  needed) following your surgery.     The drugs are best taken with food.  Do not take if it causes stomach upset or if you have a history of ulcers or gastritis. You can stop the naprosyn (or ibuprofen) or reduce the dose when you are feeling better.    DO NOT use naprosyn, ibuprofen, or other similar agents (eg. Advil or Aleve) if you have inflammatory bowel disease (Ulcerative Colitis or Crohn's disease) or if your doctor as advised you against using these medications    Take acetominaphen (Tylenol) 650-1000 mg four times a day.     Take this on a regular basis (not as needed) following surgery for pain control.     Take the lower dose if you are >65 years old or have liver disease. The maximum dose of acetominaphen is 4000 mg a day. You can stop the acetaminophen or reduce the dose when you are feeling better.    It is important to realize that many narcotic pain relievers (including vicodin, percocet, tylenol #3) also have acetaminophen, and excessive doses of acetaminophen can be dangerous, so do not take these in addition to acetominaphen.  You may take narcotics that don't contain acetominaphen such as oxycodone.      Take oxycodone AS NEEDED in addition to the acetominaphen and naprosyn.      Because narcotics have side effects (including constipation), you should reduce your use of these medications as tolerated as your pain improves.    *In general, the best strategy is to take (if you are able to tolerate it) the tylenol and naprosen on a regular basis until your pain has largely gone away. You can take the narcotic pain medicine as needed in addition to the tylenol and ibuprofen. As your pain begins to lessen, you should cut back on your narcotic use while continuing to take your regular tylenol and naprosyn doses.      Refilling prescriptions. If you need additional pain medication, please call the triage nurse at [phone] during normal business hours (8 a.m. to 4 p.m., Monday though Friday) or have  your pharmacy fax a refill request to [phone]. If you call after hours or on the weekends, the doctor on call may not know you personally and may not renew narcotic pain medication by phone. Call your primary care provider for all other medication refills.    Perineal care:  External gauze dressing can be removed the morning after surgery. If you have an adhesive dressing stuck to the incision, DO NOT remove this.   Tub baths:    If possible, take a tub bath immediately after each bowel movement.     Baths should be take at least 3 times daily for the first week to 10 days following your procedure. You should soak in the tub for 10 to 15 minutes each time with water as warm as you can tolerate.     Even after you go back to work, it is a good idea to sit in the tub in the morning, after returning from work, and again in the evening before bedtime.    Bleeding/Infection:    You can expect to have some bleeding after bowel movements, but it should stop soon after you wipe.     Use a wet cloth or perianal pad (Tucks or Preparation H pads) to gently wipe the area after each bowel movement.    Do not rub the anal area or use a lot of pressure.    Using a spray bottle filled with warm water helps loosen any remaining stool. Blot gently with a soft dry cloth or tissue paper.    Infection around the anal opening is not very common. The anal area has excellent blood supply, which helps the area to heal. Bloody discharge after bowel movements is normal and may last 2 to 4 weeks after your surgery. However, if you bleed between bowel movements and cannot get it to stop, call the triage nurse immediately [phone].    Bowel function:  Take a fiber supplement such as Metamucil, which is over the counter. It is important to drink six to eight glasses of water or juice everyday when using fiber products.    If you do not have a bowel movement after 1-2 days:    Take Milk of Magnesia-2 tablespoons.       If there are no  results, repeat this or add over the counter Miralax.      If you still do not have results, contact the clinic.     If there are no results, repeat this. Stop taking Milk of Magnesia or other laxatives if you begin to have diarrhea.    * Constipation will cause you to strain when you have a bowel movement. The hard stool will be difficult to pass, will increase pain and bleeding, and will slow down healing.  Try to avoid constipation and/or diarrhea as this can make the pain and bleeding worse.    * It is important to have regular bowel movements at least every other day and to keep your stool soft.  A high fiber diet, including at least four servings of fruits or vegetables daily, will help to keep your bowel movements regular and soft.    Activity:  After your procedure, there are no restrictions on your activity     except restrictions surrounding being on narcotics and in pain, such as no heavy machine operating or driving.     You may walk, climb stairs, ride in a car, and sit as tolerated.     It is helpful to avoid sitting in one position for long periods (2 or more hours).    After some surgeries, you may be told not to perform any lifting (more than 10 pounds) for several weeks after surgery.    When to call:  When do I need to call the doctor or triage nurse?    If you experience any of the problems listed here, call our triage nurse during business hours ([phone]).     The nurse will help you with your problem or have the doctor call you.     After hours and on weekends, please call the main hospital number ([phone]) and ask for the colon and rectal surgery person on call.     Some is available to help you 24 hours a day, seven days a week.    Call for:   ? Fever greater than 101 degrees   ? Chills   ? Foul-smelling drainage   ? Nausea and vomiting   ? Diarrhea - greater than 3 water stools in 24 hours   ? Constipation - no bowel movement after 3 days   ? Severe bleeding that does not stop soon  after a bowel movement   ? Problems with the incision, including increased pain, swelling, or redness    [facility]  Home Care Following Anesthesia  For 24 hours after surgery:  1. Get plenty of rest.  A responsible adult must stay with you for at least 24 hours after you leave the surgery center.  2. Do not drive or use heavy equipment.  If you have weakness or tingling, don't drive or use heavy equipment until this feeling goes away.   3. Do not drink alcohol.   4. Avoid strenuous or risky activities.  Ask for help when climbing stairs.  5. You may feel lightheaded.  IF so, sit for a few minutes before standing.  Have someone help you get up.   6. If you have nausea (feel sick to your stomach): Drink only clear liquids such as apple juice, ginger ale, broth or 7-Up.  Rest may also help.  Be sure to drink enough fluids.  Move to a regular diet as you feel able.   7. You may have a slight fever.  Call the doctor if your fever is over 100 F (37.7 C) (taken under the tongue) or lasts longer than 24 hours.  8. You may have a dry mouth, a sore throat, muscle aches or trouble sleeping. These should go away after 24 hours.  9. Do not make important or legal decisions.   Tips for taking pain medications  To get the best pain relief possible, remember these points:    Take pain medications as directed, before pain becomes severe.    Pain medication can upset your stomach: taking it with food may help.    Constipation is a common side effect of pain medication. Drink plenty of  fluids.    Eat foods high in fiber. Take a stool softener if recommended by your doctor or pharmacist.    Do not drink alcohol, drive or operate machinery while taking pain medications.    Ask about other ways to control pain, such as with heat, ice or relaxation.    Tylenol/Acetaminophen Consumption  To help encourage the safe use of acetaminophen, the makers of TYLENOL  have lowered the maximum daily dose for  single-ingredient Extra Strength TYLENOL  (acetaminophen) products sold in the [place] from 8 pills per day (4,000 mg) to 6 pills per day (3,000 mg). The dosing interval has also changed from 2 pills every 4-6 hours to 2 pills every 6 hours.    If you feel your pain relief is insufficient, you may take Tylenol/Acetaminophen in addition to your narcotic pain medication.     Be careful not to exceed 3,000 mg of Tylenol/Acetaminophen in a 24 hour period from all sources.    If you are taking extra strength Tylenol/acetaminophen (500 mg), the maximum dose is 6 tablets in 24 hours.    If you are taking regular strength acetaminophen (325 mg), the maximum dose is 9 tablets in 24 hours.  Last dose of Tylenol:  975 mg at 10:53 am.  Next dose at 4:53 pm.    Call a doctor for any of the followin. Signs of infection (fever, growing tenderness at the surgery site, a large amount of drainage or bleeding, severe pain, foul-smelling drainage, redness, swelling).  2. It has been over 8 to 10 hours since surgery and you are still not able to urinate (pass water).  3. Headache for over 24 hours.  Your doctor is:       Dr. Bird Moreland, Colon Rectal: [phone]               Or dial [phone] and ask for the resident on call for:  Colorectal Surgery  For emergency care, call the:  [facility]:  [phone] (TTY for hearing impaired: [phone])    Pending Results     No orders found from 2018 to 2018.            Admission Information     Date & Time Provider Department Dept. Phone    2018 Bird Moreland MD [facility] [phone]      Your Vitals Were     Blood Pressure Pulse Temperature Respirations Pulse Oximetry       100/71 (Cuff Size: Adult Regular) 96 98.5  F (36.9  C) (Oral) 14 95%       [facility] Information     [employer] is an electronic gateway that provides easy, online access to your medical records. With [employer], you can request a clinic appointment,  read your test results, renew a prescription or communicate with your care team.     To sign up for [employer] visit the website at [email or web address]   You will be asked to enter the access code listed below, as well as some personal information. Please follow the directions to create your username and password.     Your access code is: [email or web address]  Expires: 2019  6:30 AM     Your access code will  in 90 days. If you need help or a new code, please contact your [facility] or call [phone] for assistance.        Care EveryWhere ID     This is your Care EveryWhere ID. This could be used by other organizations to access your [facility] medical records  [id number]        Equal Access to Services     KULWANT DANGELO : Chrystal Dias, waaxjulian luqadaha, qaybta kaalmada lynn, radha joyce. So [facility] [phone].    ATENCIÓN: Si habla español, tiene a diaz disposición servicios gratuitos de asistencia lingüística. Llame al [phone].    We comply with applicable federal civil rights laws and [place] laws. We do not discriminate on the basis of race, color, national origin, age, disability, sex, sexual orientation, or gender identity.               Review of your medicines      UNREVIEWED medicines. Ask your doctor about these medicines        Dose / Directions    ibuprofen 600 MG tablet   Commonly known as:  ADVIL/MOTRIN   Used for:  Anal fistula        Dose:  600 mg   Take 1 tablet by mouth every 6 hours as needed for other (For mild pain or temperature greater than 102F).   Quantity:  50 tablet   Refills:  0       lidocaine VISCOUS 2 % solution   Commonly known as:  XYLOCAINE   Used for:  Anal fistula        Dose:  3 mL   Take 3 mLs by mouth every 4 hours as needed for pain (apply to anus for pain.). swish and spit every 3-8 hours as needed; max 8 doses/24 hour period   Quantity:  100 mL   Refills:  0        oxyCODONE-acetaminophen 5-325 MG tablet   Commonly known as:  PERCOCET   Used for:  Anal fistula        Dose:  1-2 tablet   Take 1-2 tablets by mouth every 4 hours as needed for pain.   Quantity:  30 tablet   Refills:  0       senna-docusate 8.6-50 MG tablet   Commonly known as:  SENOKOT-S/PERICOLACE   Used for:  Anal fistula        Dose:  1-2 tablet   Take 1-2 tablets by mouth 2 times daily as needed for constipation.   Quantity:  30 tablet   Refills:  0         CONTINUE these medicines which have NOT CHANGED        Dose / Directions    NO ACTIVE MEDICATIONS        Refills:  0                Protect others around you: Learn how to safely use, store and throw away your medicines at www.disposemymeds.org.             Medication List: This is a list of all your medications and when to take them. Check marks below indicate your daily home schedule. Keep this list as a reference.      Medications           Morning Afternoon Evening Bedtime As Needed    ibuprofen 600 MG tablet   Commonly known as:  ADVIL/MOTRIN   Take 1 tablet by mouth every 6 hours as needed for other (For mild pain or temperature greater than 102F).                                lidocaine VISCOUS 2 % solution   Commonly known as:  XYLOCAINE   Take 3 mLs by mouth every 4 hours as needed for pain (apply to anus for pain.). swish and spit every 3-8 hours as needed; max 8 doses/24 hour period                                NO ACTIVE MEDICATIONS                                oxyCODONE-acetaminophen 5-325 MG tablet   Commonly known as:  PERCOCET   Take 1-2 tablets by mouth every 4 hours as needed for pain.                                senna-docusate 8.6-50 MG tablet   Commonly known as:  SENOKOT-S/PERICOLACE   Take 1-2 tablets by mouth 2 times daily as needed for constipation.

## 2018-12-07 NOTE — DISCHARGE INSTRUCTIONS
Anorectal Surgery Instructions    What can I expect after anorectal surgery?  Most anorectal procedures are done as outpatient surgery, and you go home the same day as the procedure. A few surgical procedures will require that you stay in the hospital for about one to three days. No matter where the procedure is done or how long or short it takes, these recommendations will help you heal and feel more comfortable.    Medicines:  The anal area is very sensitive; you can expect to have some pain for up to 2-4 weeks after the procedure. Your doctor will give you a prescription for one or more pain medications.    Take naprosyn 500 mg twice a day OR ibuprofen 600 mg four times a day     Take this on a regular basis (not as needed) following your surgery.     The drugs are best taken with food.  Do not take if it causes stomach upset or if you have a history of ulcers or gastritis. You can stop the naprosyn (or ibuprofen) or reduce the dose when you are feeling better.    DO NOT use naprosyn, ibuprofen, or other similar agents (eg. Advil or Aleve) if you have inflammatory bowel disease (Ulcerative Colitis or Crohn's disease) or if your doctor as advised you against using these medications    Take acetominaphen (Tylenol) 650-1000 mg four times a day.     Take this on a regular basis (not as needed) following surgery for pain control.     Take the lower dose if you are >65 years old or have liver disease. The maximum dose of acetominaphen is 4000 mg a day. You can stop the acetaminophen or reduce the dose when you are feeling better.    It is important to realize that many narcotic pain relievers (including vicodin, percocet, tylenol #3) also have acetaminophen, and excessive doses of acetaminophen can be dangerous, so do not take these in addition to acetominaphen.  You may take narcotics that don't contain acetominaphen such as oxycodone.      Take oxycodone AS NEEDED in addition to the acetominaphen and naprosyn.       Because narcotics have side effects (including constipation), you should reduce your use of these medications as tolerated as your pain improves.    *In general, the best strategy is to take (if you are able to tolerate it) the tylenol and naprosen on a regular basis until your pain has largely gone away. You can take the narcotic pain medicine as needed in addition to the tylenol and ibuprofen. As your pain begins to lessen, you should cut back on your narcotic use while continuing to take your regular tylenol and naprosyn doses.      Refilling prescriptions. If you need additional pain medication, please call the triage nurse at [phone] during normal business hours (8 a.m. to 4 p.m., Monday though Friday) or have your pharmacy fax a refill request to [phone]. If you call after hours or on the weekends, the doctor on call may not know you personally and may not renew narcotic pain medication by phone. Call your primary care provider for all other medication refills.    Perineal care:  External gauze dressing can be removed the morning after surgery. If you have an adhesive dressing stuck to the incision, DO NOT remove this.   Tub baths:    If possible, take a tub bath immediately after each bowel movement.     Baths should be take at least 3 times daily for the first week to 10 days following your procedure. You should soak in the tub for 10 to 15 minutes each time with water as warm as you can tolerate.     Even after you go back to work, it is a good idea to sit in the tub in the morning, after returning from work, and again in the evening before bedtime.    Bleeding/Infection:    You can expect to have some bleeding after bowel movements, but it should stop soon after you wipe.     Use a wet cloth or perianal pad (Tucks or Preparation H pads) to gently wipe the area after each bowel movement.    Do not rub the anal area or use a lot of pressure.    Using a spray bottle filled with warm water helps  loosen any remaining stool. Blot gently with a soft dry cloth or tissue paper.    Infection around the anal opening is not very common. The anal area has excellent blood supply, which helps the area to heal. Bloody discharge after bowel movements is normal and may last 2 to 4 weeks after your surgery. However, if you bleed between bowel movements and cannot get it to stop, call the triage nurse immediately [phone].    Bowel function:  Take a fiber supplement such as Metamucil, which is over the counter. It is important to drink six to eight glasses of water or juice everyday when using fiber products.    If you do not have a bowel movement after 1-2 days:    Take Milk of Magnesia-2 tablespoons.       If there are no results, repeat this or add over the counter Miralax.      If you still do not have results, contact the clinic.     If there are no results, repeat this. Stop taking Milk of Magnesia or other laxatives if you begin to have diarrhea.    * Constipation will cause you to strain when you have a bowel movement. The hard stool will be difficult to pass, will increase pain and bleeding, and will slow down healing.  Try to avoid constipation and/or diarrhea as this can make the pain and bleeding worse.    * It is important to have regular bowel movements at least every other day and to keep your stool soft.  A high fiber diet, including at least four servings of fruits or vegetables daily, will help to keep your bowel movements regular and soft.    Activity:  After your procedure, there are no restrictions on your activity     except restrictions surrounding being on narcotics and in pain, such as no heavy machine operating or driving.     You may walk, climb stairs, ride in a car, and sit as tolerated.     It is helpful to avoid sitting in one position for long periods (2 or more hours).    After some surgeries, you may be told not to perform any lifting (more than 10 pounds) for several weeks after  surgery.    When to call:  When do I need to call the doctor or triage nurse?    If you experience any of the problems listed here, call our triage nurse during business hours ([phone]).     The nurse will help you with your problem or have the doctor call you.     After hours and on weekends, please call the main hospital number ([phone]) and ask for the colon and rectal surgery person on call.     Some is available to help you 24 hours a day, seven days a week.    Call for:   ? Fever greater than 101 degrees   ? Chills   ? Foul-smelling drainage   ? Nausea and vomiting   ? Diarrhea - greater than 3 water stools in 24 hours   ? Constipation - no bowel movement after 3 days   ? Severe bleeding that does not stop soon after a bowel movement   ? Problems with the incision, including increased pain, swelling, or redness    [facility]  Home Care Following Anesthesia  For 24 hours after surgery:  1. Get plenty of rest.  A responsible adult must stay with you for at least 24 hours after you leave the surgery center.  2. Do not drive or use heavy equipment.  If you have weakness or tingling, don't drive or use heavy equipment until this feeling goes away.   3. Do not drink alcohol.   4. Avoid strenuous or risky activities.  Ask for help when climbing stairs.  5. You may feel lightheaded.  IF so, sit for a few minutes before standing.  Have someone help you get up.   6. If you have nausea (feel sick to your stomach): Drink only clear liquids such as apple juice, ginger ale, broth or 7-Up.  Rest may also help.  Be sure to drink enough fluids.  Move to a regular diet as you feel able.   7. You may have a slight fever.  Call the doctor if your fever is over 100 F (37.7 C) (taken under the tongue) or lasts longer than 24 hours.  8. You may have a dry mouth, a sore throat, muscle aches or trouble sleeping. These should go away after 24 hours.  9. Do not make important or legal  decisions.   Tips for taking pain medications  To get the best pain relief possible, remember these points:    Take pain medications as directed, before pain becomes severe.    Pain medication can upset your stomach: taking it with food may help.    Constipation is a common side effect of pain medication. Drink plenty of  fluids.    Eat foods high in fiber. Take a stool softener if recommended by your doctor or pharmacist.    Do not drink alcohol, drive or operate machinery while taking pain medications.    Ask about other ways to control pain, such as with heat, ice or relaxation.    Tylenol/Acetaminophen Consumption  To help encourage the safe use of acetaminophen, the makers of TYLENOL  have lowered the maximum daily dose for single-ingredient Extra Strength TYLENOL  (acetaminophen) products sold in the [place] from 8 pills per day (4,000 mg) to 6 pills per day (3,000 mg). The dosing interval has also changed from 2 pills every 4-6 hours to 2 pills every 6 hours.    If you feel your pain relief is insufficient, you may take Tylenol/Acetaminophen in addition to your narcotic pain medication.     Be careful not to exceed 3,000 mg of Tylenol/Acetaminophen in a 24 hour period from all sources.    If you are taking extra strength Tylenol/acetaminophen (500 mg), the maximum dose is 6 tablets in 24 hours.    If you are taking regular strength acetaminophen (325 mg), the maximum dose is 9 tablets in 24 hours.  Last dose of Tylenol:  975 mg at 10:53 am.  Next dose at 4:53 pm.    Call a doctor for any of the followin. Signs of infection (fever, growing tenderness at the surgery site, a large amount of drainage or bleeding, severe pain, foul-smelling drainage, redness, swelling).  2. It has been over 8 to 10 hours since surgery and you are still not able to urinate (pass water).  3. Headache for over 24 hours.  Your doctor is:       Dr. Bird Moreland, Colon Rectal: [phone]               Or dial [phone] and ask  for the resident on call for:  Colorectal Surgery  For emergency care, call the:  [facility]:  [phone] (TTY for hearing impaired: [phone])

## 2018-12-09 NOTE — OP NOTE
Procedure Date: 12/07/2018      PREOPERATIVE DIAGNOSIS:  Anal fistula.      POSTOPERATIVE DIAGNOSIS:  Anal fistula.      PROCEDURE:  Examination under anesthesia with Seton replacement.      HISTORY:  This 43-year-old man presented with a large left posterior perianal abscess in 2013.  This was initially drained by Dr. Messina, but I subsequently brought the patient to the operating room on 1/23/2013, at which time I placed a Silastic Seton.  The patient had had a pelvic MRI documenting a complex fistula in the area.  The patient has done very well with the Seton in place, and has been quite satisfied with his function.  He presented to clinic because the Seton was Fraying.  This unfortunately could not be replaced in clinic, so he presents to the operating room today for examination under anesthesia and Seton replacement.  I discussed the risks and alternatives in detail with the patient.  I answered all of his questions to his stated satisfaction.  He expressed understanding and provided informed consent       SURGEON:  Bird Moreland MD       ASSISTANT:  Martha Portillo MD.       DESCRIPTION OF PROCEDURE:  With the patient in the prone jackknife position and the buttocks taped apart, under intravenous sedation by Anesthesia, the perianal skin was prepped and draped in sterile fashion.  A timeout was performed and the patient and procedure confirmed.  Examination under anesthesia demonstrated an obvious left posterolateral external fistula site, external fistula opening with a chronically scarred opening.  There was a large eschar overlying the left ischiorectal fossa consistent with the patient's previous incision and drainage.  There was a small secondary fistula opening close to the anal margin about 2 cm anterior to the primary opening.  This could be a tract with a fistula probe towards what seemed to be the primary fistula site.  It did not track into the anal canal itself.  Local infiltration of 0.25%  Marcaine with epinephrine was used for local perianal block.  I was able to easily pass the fistula probe almost all the way to the internal opening, which appeared evident on anoscopic examination.  The anal canal was otherwise normal.  I injected milk into the external opening and this confirmed the presence of the internal opening at the position I expected.  I placed a crypt hook into the internal opening and enlarged it slightly using electrocautery.  With this, I was able to pass a fistula probe down the fistula tract.  I placed a yellow Silastic vessel loop Seton through the fistula tract and secured it in a loose configuration to itself with four 2-0 silk ties.  A fluff dressing was then applied.  The procedure was terminated.  Estimated blood loss was minimal.  Sponge, needle, and instrument counts were reported as correct at the conclusion of the case x2.  There were no immediate operative complications.         JESSICA CERNA MD             D: 2018   T: 2018   MT: DECLAN      Name:     PORFIRIO FAN   MRN:      [id number]        Account:        [id number]   :      1975           Procedure Date: 2018      Document: [id number]       cc: JANIA SALAZAR MD

## 2018-12-24 ENCOUNTER — TELEPHONE (OUTPATIENT)
Dept: SURGERY | Facility: CLINIC | Age: 43
End: 2018-12-24

## 2018-12-24 NOTE — TELEPHONE ENCOUNTER
Called to check on patient after his procedure with Dr. Moreland on 12/7/18. No answer, LM for patient, will follow up again with patient. He will also need a post op appt scheduled with Althea Grover NP. Left direct number for pt to call back to leave any questions he has.     Ruben HAYDEN LPN

## 2019-01-02 NOTE — TELEPHONE ENCOUNTER
Attempt to call patient to follow up with patient after his procedure with Dr. Moreland. No answer, LM for patient with direct number to call back if he has any questions and to schedule post op if he would like to see our NP.    Ruben HAYDEN LPN

## 2019-01-30 ENCOUNTER — TELEPHONE (OUTPATIENT)
Dept: SURGERY | Facility: CLINIC | Age: 44
End: 2019-01-30

## 2019-01-30 NOTE — TELEPHONE ENCOUNTER
Called patient to verify appointment date, time, and location. Patient did not answer phone call. A message was not left because the vm box is full.   Veronica Velazquez, EMT

## 2019-01-31 ENCOUNTER — OFFICE VISIT (OUTPATIENT)
Dept: SURGERY | Facility: CLINIC | Age: 44
End: 2019-01-31
Payer: COMMERCIAL

## 2019-01-31 VITALS
BODY MASS INDEX: 32.67 KG/M2 | WEIGHT: 220.6 LBS | DIASTOLIC BLOOD PRESSURE: 83 MMHG | HEIGHT: 69 IN | SYSTOLIC BLOOD PRESSURE: 135 MMHG | OXYGEN SATURATION: 99 % | HEART RATE: 66 BPM

## 2019-01-31 DIAGNOSIS — Z09 FOLLOW-UP EXAMINATION FOLLOWING SURGERY: Primary | ICD-10-CM

## 2019-01-31 DIAGNOSIS — K60.30 ANAL FISTULA: ICD-10-CM

## 2019-01-31 ASSESSMENT — MIFFLIN-ST. JEOR: SCORE: 1886.02

## 2019-01-31 ASSESSMENT — PAIN SCALES - GENERAL: PAINLEVEL: NO PAIN (0)

## 2019-01-31 NOTE — PROGRESS NOTES
Colon and Rectal Surgery Postoperative Clinic Note    RE: Herson Barlow  : 1975  MELISSA: 2019    Herson Barlow is a very pleasant 44 year old male with complex perianal fistula with seton originally placed by Dr. Olvera in  and was replaced by Dr. Moreland in the OR on 2018. He presents today for follow up.    Interval history: Mr. Barlow has been doing well.  He had some minimal bleeding and was sore initially but is now feeling much better.  He reports that the seton is very comfortable and he is very pleased with this.  He is having normal bowel movements.  He denies any current drainage.    Assessment/Plan:  44 year old male status post examination under anesthesia with seton replacement on 2018 with Dr. Moreland. He is recovering well. He finds this seton very comfortable. Ties are within the tract. He can rotate this as needed for comfort. Would like to check his seton in 8-12 months. He can otherwise follow up as needed. Patient's questions were answered to his stated satisfaction and he is in agreement with this plan.    Medical history:  Past Medical History:   Diagnosis Date     External hemorrhoids with complication      Tobacco use disorder     Smokes 1 to 2 cigs/day. Started at 26 years       Surgical history:  Past Surgical History:   Procedure Laterality Date     EXAM UNDER ANESTHESIA ANUS N/A 2018    Procedure: EXAM UNDER ANESTHESIA ANUS;  Surgeon: Bird Moreland MD;  Location: [facility] OR     FISTULOTOMY RECTUM  2013    Procedure: FISTULOTOMY RECTUM;  Rectal Exam, Fistulotomy, Seton Placement;  Surgeon: Bird Moreland MD;  Location:  OR      HEMORRHOIDECTOMY,INT/EXT,COMPLX  09     HEMORRHOID SURGERY       INCISION AND DRAINAGE PERINEAL, COMBINED  2012    Procedure: COMBINED INCISION AND DRAINAGE PERINEAL;  Incision and drainage of buttocks wound with wash out  of buttocks wound;  Surgeon: Jessica Olvera MD;  [facility]      INCISION AND DRAINAGE RECTUM, COMBINED  12/25/2012    Procedure: COMBINED INCISION AND DRAINAGE RECTUM;  Exam Under Anesthesia, drainage of deep post anal abcess;  Surgeon: Bird Moreland MD;  Location: [facility] OR     IRRIGATION AND DEBRIDEMENT RECTUM, COMBINED  6/27/2012    Procedure: COMBINED IRRIGATION AND DEBRIDEMENT RECTUM;  Irrigation and Debridement Rectal Abscess;  Surgeon: Jessica Olvera MD;  Location: [facility] OR     PLACEMENT OF SETON RECTUM N/A 12/7/2018    Procedure: PLACEMENT OF SETON RECTUM;  Surgeon: Bird Moreland MD;  Location: [facility] OR       Problem list:  Patient Active Problem List    Diagnosis Date Noted     Perirectal abscess 06/27/2012     Priority: Medium     CARDIOVASCULAR SCREENING; LDL GOAL LESS THAN 160 05/09/2010     Priority: Medium     External hemorrhoids with complication      Priority: Medium     Tobacco use disorder      Priority: Medium     Smokes 6 to 7 cigs/day. Started at 26 years         Medications:  Current Outpatient Medications   Medication Sig Dispense Refill     ibuprofen (ADVIL,MOTRIN) 600 MG tablet Take 1 tablet by mouth every 6 hours as needed for other (For mild pain or temperature greater than 102F). 50 tablet 0     NO ACTIVE MEDICATIONS        senna-docusate (SENOKOT-S;PERICOLACE) 8.6-50 MG per tablet Take 1-2 tablets by mouth 2 times daily as needed for constipation. 30 tablet 0     lidocaine (XYLOCAINE) 2 % solution Take 3 mLs by mouth every 4 hours as needed for pain (apply to anus for pain.). swish and spit every 3-8 hours as needed; max 8 doses/24 hour period (Patient not taking: Reported on 11/20/2018) 100 mL 0     oxyCODONE-acetaminophen (PERCOCET) 5-325 MG per tablet Take 1-2 tablets by mouth every 4 hours as needed for pain. (Patient not taking: Reported on 11/20/2018) 30 tablet 0       Allergies:  Allergies   Allergen Reactions     Ivp Dye [Contrast Dye]        Family history:  Family History   Problem Relation Age of Onset     Diabetes Brother       "Diabetes Brother        Social history:  Social History     Tobacco Use     Smoking status: Light Tobacco Smoker     Types: Cigarettes     Smokeless tobacco: Never Used     Tobacco comment: 1 to 2  cigarettes/day. 2nd hand smoke exposure from roommates.   Substance Use Topics     Alcohol use: No     Marital status: single.    Nursing Notes:   Veronica Velazquez EMT  1/31/2019  9:02 AM  Signed  Chief Complaint   Patient presents with     Surgical Followup       Vitals:    01/31/19 0846   BP: 135/83   BP Location: Left arm   Patient Position: Sitting   Cuff Size: Adult Regular   Pulse: 66   SpO2: 99%   Weight: 220 lb 9.6 oz   Height: 5' 9\"       Body mass index is 32.58 kg/m .      DENIZ Cadena                         Physical Examination: Exam was chaperoned by DENIZ Cadena   /83 (BP Location: Left arm, Patient Position: Sitting, Cuff Size: Adult Regular)   Pulse 66   Ht 5' 9\"   Wt 220 lb 9.6 oz   SpO2 99%   BMI 32.58 kg/m    General: alert, oriented, in no acute distress, sitting comfortably  HEENT: mucous membranes moist  Perianal external examination:  Yellow vessel loop seton in place in the posterior position with ties within tract. No erythema or drainage.    Digital rectal examination: Was deferred.    Anoscopy: Was deferred.    Total face to face time was 15 minutes, >50% counseling.  This is a postop visit.    MARIANGEL Bess, NP-C  Colon and Rectal Surgery  [facility]    This note was created using speech recognition software and may contain unintended word substitutions.      "

## 2019-01-31 NOTE — NURSING NOTE
"Chief Complaint   Patient presents with     Surgical Followup       Vitals:    01/31/19 0846   BP: 135/83   BP Location: Left arm   Patient Position: Sitting   Cuff Size: Adult Regular   Pulse: 66   SpO2: 99%   Weight: 220 lb 9.6 oz   Height: 5' 9\"       Body mass index is 32.58 kg/m .      Veronica Vleazquez, EMT                      "

## 2019-01-31 NOTE — LETTER
2019       RE: Herson Barlow  [address]  [facility] [address]     Dear Colleague,    Thank you for referring your patient, Herson Barlow, to the [facility] COLON AND RECTAL SURGERY at [facility]. Please see a copy of my visit note below.    Colon and Rectal Surgery Postoperative Clinic Note    RE: Herson Barlow  : 1975  MELISSA: 2019    Herson Barlow is a very pleasant 44 year old male with complex perianal fistula with seton originally placed by Dr. Olvera in  and was replaced by Dr. Moreland in the OR on 2018. He presents today for follow up.    Interval history: Mr. Barlow has been doing well.  He had some minimal bleeding and was sore initially but is now feeling much better.  He reports that the seton is very comfortable and he is very pleased with this.  He is having normal bowel movements.  He denies any current drainage.    Assessment/Plan:  44 year old male status post examination under anesthesia with seton replacement on 2018 with Dr. Moreland. He is recovering well. He finds this seton very comfortable. Ties are within the tract. He can rotate this as needed for comfort. Would like to check his seton in 8-12 months. He can otherwise follow up as needed. Patient's questions were answered to his stated satisfaction and he is in agreement with this plan.    Medical history:  Past Medical History:   Diagnosis Date     External hemorrhoids with complication      Tobacco use disorder     Smokes 1 to 2 cigs/day. Started at 26 years       Surgical history:  Past Surgical History:   Procedure Laterality Date     EXAM UNDER ANESTHESIA ANUS N/A 2018    Procedure: EXAM UNDER ANESTHESIA ANUS;  Surgeon: Bird Moreland MD;  [facility]     FISTULOTOMY RECTUM  2013    Procedure: FISTULOTOMY RECTUM;  Rectal Exam, Fistulotomy, Seton Placement;  Surgeon: Bird Moreland MD;  Location: [facility] OR       HEMORRHOIDECTOMY,INT/EXT,COMPLX  9/21/09     HEMORRHOID SURGERY  2009     INCISION AND DRAINAGE PERINEAL, COMBINED  6/29/2012    Procedure: COMBINED INCISION AND DRAINAGE PERINEAL;  Incision and drainage of buttocks wound with wash out  of buttocks wound;  Surgeon: Jessica Olvera MD;  Location: [facility] OR     INCISION AND DRAINAGE RECTUM, COMBINED  12/25/2012    Procedure: COMBINED INCISION AND DRAINAGE RECTUM;  Exam Under Anesthesia, drainage of deep post anal abcess;  Surgeon: Bird Moreland MD;  Location: [facility] OR     IRRIGATION AND DEBRIDEMENT RECTUM, COMBINED  6/27/2012    Procedure: COMBINED IRRIGATION AND DEBRIDEMENT RECTUM;  Irrigation and Debridement Rectal Abscess;  Surgeon: Jessica Olvera MD;  Location: [facility] OR     PLACEMENT OF SETON RECTUM N/A 12/7/2018    Procedure: PLACEMENT OF SETON RECTUM;  Surgeon: Bird Moreland MD;  [facility] OR       Problem list:  Patient Active Problem List    Diagnosis Date Noted     Perirectal abscess 06/27/2012     Priority: Medium     CARDIOVASCULAR SCREENING; LDL GOAL LESS THAN 160 05/09/2010     Priority: Medium     External hemorrhoids with complication      Priority: Medium     Tobacco use disorder      Priority: Medium     Smokes 6 to 7 cigs/day. Started at 26 years         Medications:  Current Outpatient Medications   Medication Sig Dispense Refill     ibuprofen (ADVIL,MOTRIN) 600 MG tablet Take 1 tablet by mouth every 6 hours as needed for other (For mild pain or temperature greater than 102F). 50 tablet 0     NO ACTIVE MEDICATIONS        senna-docusate (SENOKOT-S;PERICOLACE) 8.6-50 MG per tablet Take 1-2 tablets by mouth 2 times daily as needed for constipation. 30 tablet 0     lidocaine (XYLOCAINE) 2 % solution Take 3 mLs by mouth every 4 hours as needed for pain (apply to anus for pain.). swish and spit every 3-8 hours as needed; max 8 doses/24 hour period (Patient not taking: Reported on 11/20/2018) 100 mL 0     oxyCODONE-acetaminophen  "(PERCOCET) 5-325 MG per tablet Take 1-2 tablets by mouth every 4 hours as needed for pain. (Patient not taking: Reported on 11/20/2018) 30 tablet 0       Allergies:  Allergies   Allergen Reactions     Ivp Dye [Contrast Dye]        Family history:  Family History   Problem Relation Age of Onset     Diabetes Brother      Diabetes Brother        Social history:  Social History     Tobacco Use     Smoking status: Light Tobacco Smoker     Types: Cigarettes     Smokeless tobacco: Never Used     Tobacco comment: 1 to 2  cigarettes/day. 2nd hand smoke exposure from roommates.   Substance Use Topics     Alcohol use: No     Marital status: single.    Nursing Notes:   Veronica Velazquez EMT  1/31/2019  9:02 AM  Signed  Chief Complaint   Patient presents with     Surgical Followup       Vitals:    01/31/19 0846   BP: 135/83   BP Location: Left arm   Patient Position: Sitting   Cuff Size: Adult Regular   Pulse: 66   SpO2: 99%   Weight: 220 lb 9.6 oz   Height: 5' 9\"       Body mass index is 32.58 kg/m .    DENIZ Cadena    Physical Examination: Exam was chaperoned by DENIZ Cadena   /83 (BP Location: Left arm, Patient Position: Sitting, Cuff Size: Adult Regular)   Pulse 66   Ht 5' 9\"   Wt 220 lb 9.6 oz   SpO2 99%   BMI 32.58 kg/m     General: alert, oriented, in no acute distress, sitting comfortably  HEENT: mucous membranes moist  Perianal external examination:  Yellow vessel loop seton in place in the posterior position with ties within tract. No erythema or drainage.    Digital rectal examination: Was deferred.    Anoscopy: Was deferred.    Total face to face time was 15 minutes, >50% counseling.  This is a postop visit.    MARIANGEL Bess, NP-C  Colon and Rectal Surgery  [facility]    This note was created using speech recognition software and may contain unintended word substitutions.      "

## 2019-04-03 ENCOUNTER — TELEPHONE (OUTPATIENT)
Dept: SURGERY | Facility: CLINIC | Age: 44
End: 2019-04-03

## 2019-04-04 ENCOUNTER — APPOINTMENT (OUTPATIENT)
Dept: CT IMAGING | Facility: CLINIC | Age: 44
End: 2019-04-04
Attending: EMERGENCY MEDICINE
Payer: COMMERCIAL

## 2019-04-04 ENCOUNTER — HOSPITAL ENCOUNTER (EMERGENCY)
Facility: CLINIC | Age: 44
Discharge: HOME | End: 2019-04-04
Attending: EMERGENCY MEDICINE | Admitting: EMERGENCY MEDICINE
Payer: COMMERCIAL

## 2019-04-04 VITALS
HEIGHT: 69 IN | TEMPERATURE: 98.4 F | WEIGHT: 213 LBS | HEART RATE: 76 BPM | RESPIRATION RATE: 18 BRPM | OXYGEN SATURATION: 99 % | SYSTOLIC BLOOD PRESSURE: 130 MMHG | BODY MASS INDEX: 31.55 KG/M2 | DIASTOLIC BLOOD PRESSURE: 80 MMHG

## 2019-04-04 DIAGNOSIS — L02.31 GLUTEAL ABSCESS: ICD-10-CM

## 2019-04-04 LAB
ANION GAP SERPL CALCULATED.3IONS-SCNC: 8 MMOL/L (ref 3–14)
BASOPHILS # BLD AUTO: 0 10E9/L (ref 0–0.2)
BASOPHILS NFR BLD AUTO: 0.2 %
BUN SERPL-MCNC: 11 MG/DL (ref 7–30)
CALCIUM SERPL-MCNC: 8.6 MG/DL (ref 8.5–10.1)
CHLORIDE SERPL-SCNC: 106 MMOL/L (ref 94–109)
CO2 SERPL-SCNC: 24 MMOL/L (ref 20–32)
CREAT SERPL-MCNC: 0.92 MG/DL (ref 0.66–1.25)
DIFFERENTIAL METHOD BLD: ABNORMAL
EOSINOPHIL # BLD AUTO: 0.4 10E9/L (ref 0–0.7)
EOSINOPHIL NFR BLD AUTO: 2.6 %
ERYTHROCYTE [DISTWIDTH] IN BLOOD BY AUTOMATED COUNT: 13 % (ref 10–15)
GFR SERPL CREATININE-BSD FRML MDRD: >90 ML/MIN/{1.73_M2}
GLUCOSE SERPL-MCNC: 96 MG/DL (ref 70–99)
HCT VFR BLD AUTO: 43 % (ref 40–53)
HGB BLD-MCNC: 14 G/DL (ref 13.3–17.7)
IMM GRANULOCYTES # BLD: 0.1 10E9/L (ref 0–0.4)
IMM GRANULOCYTES NFR BLD: 0.4 %
LACTATE BLD-SCNC: 0.9 MMOL/L (ref 0.7–2)
LYMPHOCYTES # BLD AUTO: 3.6 10E9/L (ref 0.8–5.3)
LYMPHOCYTES NFR BLD AUTO: 22.2 %
MCH RBC QN AUTO: 31.5 PG (ref 26.5–33)
MCHC RBC AUTO-ENTMCNC: 32.6 G/DL (ref 31.5–36.5)
MCV RBC AUTO: 97 FL (ref 78–100)
MONOCYTES # BLD AUTO: 1.5 10E9/L (ref 0–1.3)
MONOCYTES NFR BLD AUTO: 9.6 %
NEUTROPHILS # BLD AUTO: 10.4 10E9/L (ref 1.6–8.3)
NEUTROPHILS NFR BLD AUTO: 65 %
NRBC # BLD AUTO: 0 10*3/UL
NRBC BLD AUTO-RTO: 0 /100
PLATELET # BLD AUTO: 372 10E9/L (ref 150–450)
POTASSIUM SERPL-SCNC: 3.9 MMOL/L (ref 3.4–5.3)
RBC # BLD AUTO: 4.44 10E12/L (ref 4.4–5.9)
SODIUM SERPL-SCNC: 138 MMOL/L (ref 133–144)
WBC # BLD AUTO: 16 10E9/L (ref 4–11)

## 2019-04-04 PROCEDURE — 83605 ASSAY OF LACTIC ACID: CPT | Performed by: EMERGENCY MEDICINE

## 2019-04-04 PROCEDURE — 74176 CT ABD & PELVIS W/O CONTRAST: CPT

## 2019-04-04 PROCEDURE — 25000132 ZZH RX MED GY IP 250 OP 250 PS 637: Performed by: EMERGENCY MEDICINE

## 2019-04-04 PROCEDURE — 80048 BASIC METABOLIC PNL TOTAL CA: CPT | Performed by: EMERGENCY MEDICINE

## 2019-04-04 PROCEDURE — 85025 COMPLETE CBC W/AUTO DIFF WBC: CPT | Performed by: EMERGENCY MEDICINE

## 2019-04-04 PROCEDURE — 99284 EMERGENCY DEPT VISIT MOD MDM: CPT | Mod: Z6 | Performed by: EMERGENCY MEDICINE

## 2019-04-04 PROCEDURE — 99284 EMERGENCY DEPT VISIT MOD MDM: CPT | Mod: 25 | Performed by: EMERGENCY MEDICINE

## 2019-04-04 RX ORDER — METRONIDAZOLE 500 MG/1
500 TABLET ORAL ONCE
Status: COMPLETED | OUTPATIENT
Start: 2019-04-04 | End: 2019-04-04

## 2019-04-04 RX ORDER — IBUPROFEN 800 MG/1
800 TABLET, FILM COATED ORAL EVERY 8 HOURS PRN
Qty: 60 TABLET | Refills: 0 | Status: SHIPPED | OUTPATIENT
Start: 2019-04-04 | End: 2019-04-12

## 2019-04-04 RX ORDER — CIPROFLOXACIN 500 MG/1
500 TABLET, FILM COATED ORAL ONCE
Status: COMPLETED | OUTPATIENT
Start: 2019-04-04 | End: 2019-04-04

## 2019-04-04 RX ORDER — METRONIDAZOLE 500 MG/1
500 TABLET ORAL 2 TIMES DAILY
Qty: 20 TABLET | Refills: 0 | Status: SHIPPED | OUTPATIENT
Start: 2019-04-04 | End: 2019-04-14

## 2019-04-04 RX ORDER — CIPROFLOXACIN 500 MG/1
500 TABLET, FILM COATED ORAL 2 TIMES DAILY
Qty: 20 TABLET | Refills: 0 | Status: SHIPPED | OUTPATIENT
Start: 2019-04-04 | End: 2019-04-14

## 2019-04-04 RX ADMIN — CIPROFLOXACIN HYDROCHLORIDE 500 MG: 500 TABLET, FILM COATED ORAL at 20:58

## 2019-04-04 RX ADMIN — METRONIDAZOLE 500 MG: 500 TABLET ORAL at 20:58

## 2019-04-04 ASSESSMENT — ENCOUNTER SYMPTOMS
FEVER: 0
WOUND: 1
SHORTNESS OF BREATH: 0
ABDOMINAL PAIN: 0

## 2019-04-04 ASSESSMENT — MIFFLIN-ST. JEOR: SCORE: 1851.54

## 2019-04-04 NOTE — ED TRIAGE NOTES
Patient presents to triage c/o abscess on his left buttock. Abscess ruptured this afternoon and is currently dressed with gauze. Pt reports this has happened a couple of times before.

## 2019-04-04 NOTE — ED PROVIDER NOTES
[place] EMERGENCY DEPARTMENT ([facility])  4/04/19   History     Chief Complaint   Patient presents with     Wound Check     The history is provided by the patient.     Herson Barlow is a 44 year old male with a medical history significant for perianal fistula with seton in place (originally placed in 2013 and replaced on 12/7/2018) who presents to the Emergency Department for a wound check.  The patient reports that he first began having swelling of the area near where the seton, 7 days ago.  The patient reports that the swelling has been worsening and today he had an area open and began draining.  The patient presents here now for evaluation.    I have reviewed the Medications, Allergies, Past Medical and Surgical History, and Social History in the [employer] system.    Past Medical History:   Diagnosis Date     External hemorrhoids with complication      Tobacco use disorder     Smokes 1 to 2 cigs/day. Started at 26 years       Past Surgical History:   Procedure Laterality Date     EXAM UNDER ANESTHESIA ANUS N/A 12/7/2018    Procedure: EXAM UNDER ANESTHESIA ANUS;  Surgeon: Bird Moreland MD;  [facility]     FISTULOTOMY RECTUM  1/23/2013    Procedure: FISTULOTOMY RECTUM;  Rectal Exam, Fistulotomy, Seton Placement;  Surgeon: Bird Moreland MD;  Location: [facility] OR     [facility] HEMORRHOIDECTOMY,INT/EXT,COMPLX  9/21/09     HEMORRHOID SURGERY  2009     INCISION AND DRAINAGE PERINEAL, COMBINED  6/29/2012    Procedure: COMBINED INCISION AND DRAINAGE PERINEAL;  Incision and drainage of buttocks wound with wash out  of buttocks wound;  Surgeon: Jessica Olvera MD;  Location: [facility] OR     INCISION AND DRAINAGE RECTUM, COMBINED  12/25/2012    Procedure: COMBINED INCISION AND DRAINAGE RECTUM;  Exam Under Anesthesia, drainage of deep post anal abcess;  Surgeon: Bird Moreland MD;  Location: [facility] OR     IRRIGATION AND DEBRIDEMENT RECTUM, COMBINED  6/27/2012    Procedure: COMBINED IRRIGATION AND DEBRIDEMENT  "RECTUM;  Irrigation and Debridement Rectal Abscess;  Surgeon: Jessica Olvera MD;  [facility]: [facility] OR     PLACEMENT OF SETON RECTUM N/A 12/7/2018    Procedure: PLACEMENT OF SETON RECTUM;  Surgeon: Bird Moreland MD;  [facility]       Family History   Problem Relation Age of Onset     Diabetes Brother      Diabetes Brother        Social History     Tobacco Use     Smoking status: Light Tobacco Smoker     Types: Cigarettes     Smokeless tobacco: Never Used     Tobacco comment: 1 to 2  cigarettes/day. 2nd hand smoke exposure from roommates.   Substance Use Topics     Alcohol use: No       No current facility-administered medications for this encounter.      Current Outpatient Medications   Medication     ciprofloxacin (CIPRO) 500 MG tablet     ibuprofen (ADVIL/MOTRIN) 800 MG tablet     metroNIDAZOLE (FLAGYL) 500 MG tablet     ibuprofen (ADVIL,MOTRIN) 600 MG tablet     lidocaine (XYLOCAINE) 2 % solution     NO ACTIVE MEDICATIONS     oxyCODONE-acetaminophen (PERCOCET) 5-325 MG per tablet     senna-docusate (SENOKOT-S;PERICOLACE) 8.6-50 MG per tablet        Allergies   Allergen Reactions     Ivp Dye [Contrast Dye]          Review of Systems   Constitutional: Negative for fever.   Respiratory: Negative for shortness of breath.    Cardiovascular: Negative for chest pain.   Gastrointestinal: Negative for abdominal pain.   Skin: Positive for wound (buttocks; opened and draining).   All other systems reviewed and are negative.      Physical Exam   BP: 160/83  Pulse: 96  Temp: 98.4  F (36.9  C)  Resp: 18  Height: 175.3 cm (5' 9\")  Weight: 96.6 kg (213 lb)  SpO2: 98 %      Physical Exam   Constitutional: He is oriented to person, place, and time. Vital signs are normal. He appears well-developed and well-nourished.  Non-toxic appearance. He does not have a sickly appearance. He does not appear ill. No distress.   HENT:   Head: Normocephalic and atraumatic.   Eyes: No scleral icterus.   Neck: Normal range of motion. " Neck supple.   Cardiovascular: Normal rate.   Pulmonary/Chest: Effort normal.   Abdominal: Soft. He exhibits no distension.   Genitourinary: Rectum normal.         Neurological: He is alert and oriented to person, place, and time.   Skin: Skin is warm and dry. No rash noted. He is not diaphoretic.       ED Course   6:55 PM  The patient was seen and examined by Manolo Purcell MD in Room ED20.        Procedures             Critical Care time:  none         Results for orders placed or performed during the hospital encounter of 04/04/19   CT Abdomen Pelvis w/o Contrast    Narrative    EXAMINATION: CT ABDOMEN PELVIS W/O CONTRAST, 4/4/2019 7:57 PM    TECHNIQUE:  Helical CT images from the lung bases through the  symphysis pubis were obtained  without contrast.    CONTRAST DOSE: No IV contrast was used    COMPARISON: [facility] 1/17/2013, CT 1/14/2013    HISTORY: large left gluteal abscess. pt allergic to IV contrast    FINDINGS:    Chest:   Mild bilateral dependent atelectasis.    Abdomen and pelvis:  Unremarkable liver and gallbladder. The spleen and adrenals are within  normal limits. Unremarkable pancreas. Unremarkable kidneys. No  hydronephrosis or renal calculus. Unremarkable urinary bladder.    Normal appendix. No bowel obstruction or abnormal bowel wall  thickening. No pelvic mass.    No ascites. No pneumatosis, portal venous gas or free air. A 1.2 cm  left external iliac lymph node. Mild enlarged left inguinal lymph node  measuring up to 1.7 cm.    Bones and soft tissues:  There is soft tissue stranding in the left perirectal distribution  (series 5 image [id number]). The seton extends at approximate 5:30 clock  position of the anal canal. Suspicion of a 4.5 x 1.2 cm thin abscess  with few internal foci of air in this region (best appreciated on  series 3 image 66).     No suspicious osseous findings.      Impression    IMPRESSION:   Known left perianal fistula with in situ seton in place. Evaluation is  limited secondary to  lack of IV contrast. Suspect thin 4.5 cm long  perirectal abscess in this region. Mild reactive left inguinal and  external iliac lymphadenopathy.    I have personally reviewed the examination and initial interpretation  and I agree with the findings.    JOSH VARGAS MD   CBC with platelets differential   Result Value Ref Range    WBC 16.0 (H) 4.0 - 11.0 10e9/L    RBC Count 4.44 4.4 - 5.9 10e12/L    Hemoglobin 14.0 13.3 - 17.7 g/dL    Hematocrit 43.0 40.0 - 53.0 %    MCV 97 78 - 100 fl    MCH 31.5 26.5 - 33.0 pg    MCHC 32.6 31.5 - 36.5 g/dL    RDW 13.0 10.0 - 15.0 %    Platelet Count 372 150 - 450 10e9/L    Diff Method Automated Method     % Neutrophils 65.0 %    % Lymphocytes 22.2 %    % Monocytes 9.6 %    % Eosinophils 2.6 %    % Basophils 0.2 %    % Immature Granulocytes 0.4 %    Nucleated RBCs 0 0 /100    Absolute Neutrophil 10.4 (H) 1.6 - 8.3 10e9/L    Absolute Lymphocytes 3.6 0.8 - 5.3 10e9/L    Absolute Monocytes 1.5 (H) 0.0 - 1.3 10e9/L    Absolute Eosinophils 0.4 0.0 - 0.7 10e9/L    Absolute Basophils 0.0 0.0 - 0.2 10e9/L    Abs Immature Granulocytes 0.1 0 - 0.4 10e9/L    Absolute Nucleated RBC 0.0    Basic metabolic panel   Result Value Ref Range    Sodium 138 133 - 144 mmol/L    Potassium 3.9 3.4 - 5.3 mmol/L    Chloride 106 94 - 109 mmol/L    Carbon Dioxide 24 20 - 32 mmol/L    Anion Gap 8 3 - 14 mmol/L    Glucose 96 70 - 99 mg/dL    Urea Nitrogen 11 7 - 30 mg/dL    Creatinine 0.92 0.66 - 1.25 mg/dL    GFR Estimate >90 >60 mL/min/[1.73_m2]    GFR Estimate If Black >90 >60 mL/min/[1.73_m2]    Calcium 8.6 8.5 - 10.1 mg/dL   Lactic acid whole blood   Result Value Ref Range    Lactic Acid 0.9 0.7 - 2.0 mmol/L            Labs Ordered and Resulted from Time of ED Arrival Up to the Time of Departure from the ED   CBC WITH PLATELETS DIFFERENTIAL - Abnormal; Notable for the following components:       Result Value    WBC 16.0 (*)     Absolute Neutrophil 10.4 (*)     Absolute Monocytes 1.5 (*)     All other  components within normal limits   BASIC METABOLIC PANEL   LACTIC ACID WHOLE BLOOD            Assessments & Plan (with Medical Decision Making)   This is a 44-year-old male coming into the emergency room with concerns for a gluteal abscess.  Patient states he has a history of these and currently has a seton in place.  On exam he has some mild induration of the left gluteus with a tract that has some serosanguineous fluid.  There is no significant signs of pus at this time.  There is not a significant amount of cellulitis in the area.  There is no signs of crepitus.  There is active drainage from a tract.  The patient does not appear ill or septic at this time.  His labs show that he has a mild leukocytosis.  CT was performed which does show a small area of collection it seems to be resolving.  I did review his medical charts as well as procedures.  After further discussion with the patient he felt comfortable being discharged with oral antibiotics and did not believe that he needed any further major interventions at this time.  He had taken Cipro and metronidazole for the same symptoms in the past that was provided as a prescription from colorectal surgery.  I will continue with the same antibiotics as this seems to have worked well for him in the past with his abscesses.  Patient was provided with aftercare instructions to utilize sitz bath's and to make an appointment with colorectal surgery as soon as possible.  Patient was provided with reasons to return to the emergency room which include worsening the symptoms, fever, active drainage or any other concerns.  Patient understands and agrees with discharge instructions and aftercare planning.    I have reviewed the nursing notes.    I have reviewed the findings, diagnosis, plan and need for follow up with the patient.       Medication List      Started    ciprofloxacin 500 MG tablet  Commonly known as:  CIPRO  500 mg, Oral, 2 TIMES DAILY     metroNIDAZOLE 500 MG  tablet  Commonly known as:  FLAGYL  500 mg, Oral, 2 TIMES DAILY        Modified    * ibuprofen 600 MG tablet  Commonly known as:  ADVIL/MOTRIN  600 mg, Oral, EVERY 6 HOURS PRN  What changed:  Another medication with the same name was added. Make sure you understand how and when to take each.     * ibuprofen 800 MG tablet  Commonly known as:  ADVIL/MOTRIN  800 mg, Oral, EVERY 8 HOURS PRN  What changed:  You were already taking a medication with the same name, and this prescription was added. Make sure you understand how and when to take each.         * This list has 2 medication(s) that are the same as other medications prescribed for you. Read the directions carefully, and ask your doctor or other care provider to review them with you.                Final diagnoses:   Gluteal abscess     IVentura, am serving as a trained medical scribe to document services personally performed by Manolo Purcell MD, based on the provider's statements to me.   Manolo TOMPKINS MD, was physically present and have reviewed and verified the accuracy of this note documented by Ventura Landis.    4/4/2019   [facility], [place], EMERGENCY DEPARTMENT     Daniel Purcell MD  04/05/19 0252       Daniel Purcell MD  04/05/19 0254

## 2019-04-04 NOTE — ED AVS SNAPSHOT
[facility], [place], Emergency Department  [address] [place] [address]  Phone:  [phone]                                    Herson Barlow   MRN: [id number]    Department:  [facility], Emergency Department   Date of Visit:  4/4/2019           After Visit Summary Signature Page    I have received my discharge instructions, and my questions have been answered. I have discussed any challenges I see with this plan with the nurse or doctor.    ..........................................................................................................................................  Patient/Patient Representative Signature      ..........................................................................................................................................  Patient Representative Print Name and Relationship to Patient    ..................................................               ................................................  Date                                   Time    ..........................................................................................................................................  Reviewed by Signature/Title    ...................................................              ..............................................  Date                                               Time          22EPIC Rev 08/18

## 2019-04-05 NOTE — DISCHARGE INSTRUCTIONS
Please make an appointment to follow up with [facility] (phone: [phone]) as soon as possible.  Results for orders placed or performed during the hospital encounter of 04/04/19   CT Abdomen Pelvis w/o Contrast    Impression    IMPRESSION:   Known left perianal fistula with in situ seton in place. Evaluation is  limited secondary to lack of IV contrast. Suspect thin 4.5 cm long  perirectal abscess in this region. Mild reactive left inguinal and  external iliac lymphadenopathy.   CBC with platelets differential   Result Value Ref Range    WBC 16.0 (H) 4.0 - 11.0 10e9/L    RBC Count 4.44 4.4 - 5.9 10e12/L    Hemoglobin 14.0 13.3 - 17.7 g/dL    Hematocrit 43.0 40.0 - 53.0 %    MCV 97 78 - 100 fl    MCH 31.5 26.5 - 33.0 pg    MCHC 32.6 31.5 - 36.5 g/dL    RDW 13.0 10.0 - 15.0 %    Platelet Count 372 150 - 450 10e9/L    Diff Method Automated Method     % Neutrophils 65.0 %    % Lymphocytes 22.2 %    % Monocytes 9.6 %    % Eosinophils 2.6 %    % Basophils 0.2 %    % Immature Granulocytes 0.4 %    Nucleated RBCs 0 0 /100    Absolute Neutrophil 10.4 (H) 1.6 - 8.3 10e9/L    Absolute Lymphocytes 3.6 0.8 - 5.3 10e9/L    Absolute Monocytes 1.5 (H) 0.0 - 1.3 10e9/L    Absolute Eosinophils 0.4 0.0 - 0.7 10e9/L    Absolute Basophils 0.0 0.0 - 0.2 10e9/L    Abs Immature Granulocytes 0.1 0 - 0.4 10e9/L    Absolute Nucleated RBC 0.0    Basic metabolic panel   Result Value Ref Range    Sodium 138 133 - 144 mmol/L    Potassium 3.9 3.4 - 5.3 mmol/L    Chloride 106 94 - 109 mmol/L    Carbon Dioxide 24 20 - 32 mmol/L    Anion Gap 8 3 - 14 mmol/L    Glucose 96 70 - 99 mg/dL    Urea Nitrogen 11 7 - 30 mg/dL    Creatinine 0.92 0.66 - 1.25 mg/dL    GFR Estimate >90 >60 mL/min/[1.73_m2]    GFR Estimate If Black >90 >60 mL/min/[1.73_m2]    Calcium 8.6 8.5 - 10.1 mg/dL   Lactic acid whole blood   Result Value Ref Range    Lactic Acid 0.9 0.7 - 2.0 mmol/L

## 2019-04-08 NOTE — TELEPHONE ENCOUNTER
[facility]    Phone Message    May a detailed message be left on voicemail: yes    Reason for Call: Other: Patient is calling in to schedule with Enio Wade the firat available i have is 04/18 and he was seen in the ED on 04/04 and he stated he needs to be seen sooner then the 18th i someone can follow up with the patient to discuss further. Thank you.     Action Taken: Message routed to:  [facility] ([facility] colon and rectal

## 2019-04-08 NOTE — TELEPHONE ENCOUNTER
Patient was called in regard to the below message. Patient states he was seen in the ED last week and they did not fix him. Patient was advised there is an opening on Althea Boateng's schedule on next Wednesday. Patient is scheduled for 4/17 at 11:30 am. Patient stated understanding of appointment date, time, and location. Patient is in agreement with this plan of care. Patient's questions and concerns were addressed to his stated satisfaction. Patient will callback directly with further questions or concerns.

## 2019-04-15 ENCOUNTER — TELEPHONE (OUTPATIENT)
Dept: SURGERY | Facility: CLINIC | Age: 44
End: 2019-04-15

## 2019-04-17 ENCOUNTER — OFFICE VISIT (OUTPATIENT)
Dept: SURGERY | Facility: CLINIC | Age: 44
End: 2019-04-17
Payer: COMMERCIAL

## 2019-04-17 ENCOUNTER — TELEPHONE (OUTPATIENT)
Dept: GASTROENTEROLOGY | Facility: CLINIC | Age: 44
End: 2019-04-17

## 2019-04-17 VITALS
HEIGHT: 69 IN | SYSTOLIC BLOOD PRESSURE: 129 MMHG | BODY MASS INDEX: 32.73 KG/M2 | WEIGHT: 221 LBS | OXYGEN SATURATION: 97 % | HEART RATE: 70 BPM | DIASTOLIC BLOOD PRESSURE: 84 MMHG

## 2019-04-17 DIAGNOSIS — K60.30 ANAL FISTULA: Primary | ICD-10-CM

## 2019-04-17 ASSESSMENT — PAIN SCALES - GENERAL: PAINLEVEL: NO PAIN (0)

## 2019-04-17 ASSESSMENT — MIFFLIN-ST. JEOR: SCORE: 1887.83

## 2019-04-17 NOTE — LETTER
"2019       RE: Herson Barlow  [address]  [facility] [address]     Dear Colleague,    Thank you for referring your patient, Herson Barlow, to the [facility] COLON AND RECTAL SURGERY at [facility]. Please see a copy of my visit note below.    Colon and Rectal Surgery Postoperative Clinic Note    RE: Herson Barlow  : 1975  MELISSA: 19    Herson Barlow is a very pleasant 44 year old male with complex perianal fistula with seton originally placed by Dr. Olvera in  and was replaced by Dr. Moreland in the OR on 2018. He presents today for follow up.    Interval history: Mr. Barlow went to the ER on 19 with increased swelling and pain near his seton. CT showed fistula within the fistula tract and a thin 4.5 cm long perirectal abscess. He was given Cipro and Flagyl for this.  His pain has since improved.  He has very minimal drainage now.  He is concerned that ever since the seton was placed that his drainage has been less.  However, he finds the seton much more comfortable than his prior seton.  He denies any fevers or chills.  He denies any difficulty with bowel movements.  He was set up to have a colonoscopy several years ago but has never followed through with this.  No family history of any inflammatory bowel disease.            /84 (BP Location: Left arm, Patient Position: Sitting, Cuff Size: Adult Large)   Pulse 70   Ht 5' 9\"   Wt 221 lb   SpO2 97%   BMI 32.64 kg/m     Physical Examination: Exam was chaperoned by Veronica Velazquez, EMT   There were no vitals taken for this visit.  General: alert, oriented, in no acute distress, sitting comfortably  HEENT: mucous membranes moist  Perianal external examination:  Yellow vessel loop seton in place in the posterior position with ties within tract.  Additional fistula opening in the left lateral position which can be probe toward the posterior position without any erythema, " induration, or drainage present.    Digital rectal examination: Was deferred.    Anoscopy: Was deferred.    Assessment/Plan:  44 year old male status post examination under anesthesia with seton replacement on 12/7/2018 with Dr. Moreland.  He appears to have developed a second fistula tract.  Given his complex anal fistula, I would recommend a repeat MRI to better characterize this before proceeding with examination under anesthesia with possible history of any possible seton replacement.  Given his complex fistula and possibly new fistula tract, would recommend a colonoscopy at this time also to rule out Crohn's disease.  Asked him to notify the clinic in the meantime if he develops any worsening symptoms, fevers, chills, or uncontrolled pain. Patient's questions were answered to his stated satisfaction and he is in agreement with this plan.    Medical history:  Past Medical History:   Diagnosis Date     External hemorrhoids with complication      Tobacco use disorder     Smokes 1 to 2 cigs/day. Started at 26 years       Surgical history:  Past Surgical History:   Procedure Laterality Date     EXAM UNDER ANESTHESIA ANUS N/A 12/7/2018    Procedure: EXAM UNDER ANESTHESIA ANUS;  Surgeon: Bird Moreland MD;  [facility]     FISTULOTOMY RECTUM  1/23/2013    Procedure: FISTULOTOMY RECTUM;  Rectal Exam, Fistulotomy, Seton Placement;  Surgeon: Bird Moreland MD;  [facility]: [facility] OR     HC HEMORRHOIDECTOMY,INT/EXT,COMPLX  9/21/09     HEMORRHOID SURGERY  2009     INCISION AND DRAINAGE PERINEAL, COMBINED  6/29/2012    Procedure: COMBINED INCISION AND DRAINAGE PERINEAL;  Incision and drainage of buttocks wound with wash out  of buttocks wound;  Surgeon: Jessica Olvera MD;  [facility]     INCISION AND DRAINAGE RECTUM, COMBINED  12/25/2012    Procedure: COMBINED INCISION AND DRAINAGE RECTUM;  Exam Under Anesthesia, drainage of deep post anal abcess;  Surgeon: Bird Moreland MD;  [facility]     IRRIGATION  AND DEBRIDEMENT RECTUM, COMBINED  6/27/2012    Procedure: COMBINED IRRIGATION AND DEBRIDEMENT RECTUM;  Irrigation and Debridement Rectal Abscess;  Surgeon: Jessica Olvera MD;  Location: [facility] OR     PLACEMENT OF SETON RECTUM N/A 12/7/2018    Procedure: PLACEMENT OF SETON RECTUM;  Surgeon: Bird Moreland MD;  [facility]       Problem list:    Patient Active Problem List    Diagnosis Date Noted     Perirectal abscess 06/27/2012     Priority: Medium     CARDIOVASCULAR SCREENING; LDL GOAL LESS THAN 160 05/09/2010     Priority: Medium     External hemorrhoids with complication      Priority: Medium     Tobacco use disorder      Priority: Medium     Smokes 6 to 7 cigs/day. Started at 26 years         Medications:  Current Outpatient Medications   Medication Sig Dispense Refill     ibuprofen (ADVIL,MOTRIN) 600 MG tablet Take 1 tablet by mouth every 6 hours as needed for other (For mild pain or temperature greater than 102F). 50 tablet 0     lidocaine (XYLOCAINE) 2 % solution Take 3 mLs by mouth every 4 hours as needed for pain (apply to anus for pain.). swish and spit every 3-8 hours as needed; max 8 doses/24 hour period (Patient not taking: Reported on 11/20/2018) 100 mL 0     NO ACTIVE MEDICATIONS        oxyCODONE-acetaminophen (PERCOCET) 5-325 MG per tablet Take 1-2 tablets by mouth every 4 hours as needed for pain. (Patient not taking: Reported on 11/20/2018) 30 tablet 0     senna-docusate (SENOKOT-S;PERICOLACE) 8.6-50 MG per tablet Take 1-2 tablets by mouth 2 times daily as needed for constipation. 30 tablet 0       Allergies:  Allergies   Allergen Reactions     Ivp Dye [Contrast Dye]        Family history:  Family History   Problem Relation Age of Onset     Diabetes Brother      Diabetes Brother        Social history:  Social History     Tobacco Use     Smoking status: Light Tobacco Smoker     Types: Cigarettes     Smokeless tobacco: Never Used     Tobacco comment: 1 to 2  cigarettes/day. 2nd hand  smoke exposure from roommates.   Substance Use Topics     Alcohol use: No     Marital status: single.      Total face to face time was 25 minutes, >50% counseling.    This note was created using speech recognition software and may contain unintended word substitutions.    Again, thank you for allowing me to participate in the care of your patient.      Sincerely,    MARIANGEL Urena CNP

## 2019-04-17 NOTE — NURSING NOTE
"Chief Complaint   Patient presents with     Rectal Problem     Rectal pain.       Vitals:    04/17/19 1122   BP: 129/84   BP Location: Left arm   Patient Position: Sitting   Cuff Size: Adult Large   Pulse: 70   SpO2: 97%   Weight: 221 lb   Height: 5' 9\"       Body mass index is 32.64 kg/m .      Veronica Velazquez, EMT                      "

## 2019-04-17 NOTE — PROGRESS NOTES
"Colon and Rectal Surgery Postoperative Clinic Note    RE: Herson Barlow  : 1975  MELISSA: 19    Herson Barlow is a very pleasant 44 year old male with complex perianal fistula with seton originally placed by Dr. Olvera in  and was replaced by Dr. Moreland in the OR on 2018. He presents today for follow up.    Interval history: Mr. Barlow went to the ER on 19 with increased swelling and pain near his seton. CT showed fistula within the fistula tract and a thin 4.5 cm long perirectal abscess. He was given Cipro and Flagyl for this.  His pain has since improved.  He has very minimal drainage now.  He is concerned that ever since the seton was placed that his drainage has been less.  However, he finds the seton much more comfortable than his prior seton.  He denies any fevers or chills.  He denies any difficulty with bowel movements.  He was set up to have a colonoscopy several years ago but has never followed through with this.  No family history of any inflammatory bowel disease.            /84 (BP Location: Left arm, Patient Position: Sitting, Cuff Size: Adult Large)   Pulse 70   Ht 5' 9\"   Wt 221 lb   SpO2 97%   BMI 32.64 kg/m    Physical Examination: Exam was chaperoned by Veronica Velazquez, EMT   There were no vitals taken for this visit.  General: alert, oriented, in no acute distress, sitting comfortably  HEENT: mucous membranes moist  Perianal external examination:  Yellow vessel loop seton in place in the posterior position with ties within tract.  Additional fistula opening in the left lateral position which can be probe toward the posterior position without any erythema, induration, or drainage present.    Digital rectal examination: Was deferred.    Anoscopy: Was deferred.    Assessment/Plan:  44 year old male status post examination under anesthesia with seton replacement on 2018 with Dr. Moreland.  He appears to have developed a second fistula tract.  Given " his complex anal fistula, I would recommend a repeat MRI to better characterize this before proceeding with examination under anesthesia with possible history of any possible seton replacement.  Given his complex fistula and possibly new fistula tract, would recommend a colonoscopy at this time also to rule out Crohn's disease.  Asked him to notify the clinic in the meantime if he develops any worsening symptoms, fevers, chills, or uncontrolled pain. Patient's questions were answered to his stated satisfaction and he is in agreement with this plan.    Medical history:  Past Medical History:   Diagnosis Date     External hemorrhoids with complication      Tobacco use disorder     Smokes 1 to 2 cigs/day. Started at 26 years       Surgical history:  Past Surgical History:   Procedure Laterality Date     EXAM UNDER ANESTHESIA ANUS N/A 12/7/2018    Procedure: EXAM UNDER ANESTHESIA ANUS;  Surgeon: Bird Moreland MD;  [facility]     FISTULOTOMY RECTUM  1/23/2013    Procedure: FISTULOTOMY RECTUM;  Rectal Exam, Fistulotomy, Seton Placement;  Surgeon: Bird Moreland MD;  [facility]     HC HEMORRHOIDECTOMY,INT/EXT,COMPLX  9/21/09     HEMORRHOID SURGERY  2009     INCISION AND DRAINAGE PERINEAL, COMBINED  6/29/2012    Procedure: COMBINED INCISION AND DRAINAGE PERINEAL;  Incision and drainage of buttocks wound with wash out  of buttocks wound;  Surgeon: Jessica Olvera MD;  [facility]     INCISION AND DRAINAGE RECTUM, COMBINED  12/25/2012    Procedure: COMBINED INCISION AND DRAINAGE RECTUM;  Exam Under Anesthesia, drainage of deep post anal abcess;  Surgeon: Bird Moreland MD;  [facility]     IRRIGATION AND DEBRIDEMENT RECTUM, COMBINED  6/27/2012    Procedure: COMBINED IRRIGATION AND DEBRIDEMENT RECTUM;  Irrigation and Debridement Rectal Abscess;  Surgeon: Jessica Olvera MD;  [facility]     PLACEMENT OF SETON RECTUM N/A 12/7/2018    Procedure: PLACEMENT OF SETON RECTUM;  Surgeon:  Bird Moreland MD;  [facility]       Problem list:    Patient Active Problem List    Diagnosis Date Noted     Perirectal abscess 06/27/2012     Priority: Medium     CARDIOVASCULAR SCREENING; LDL GOAL LESS THAN 160 05/09/2010     Priority: Medium     External hemorrhoids with complication      Priority: Medium     Tobacco use disorder      Priority: Medium     Smokes 6 to 7 cigs/day. Started at 26 years         Medications:  Current Outpatient Medications   Medication Sig Dispense Refill     ibuprofen (ADVIL,MOTRIN) 600 MG tablet Take 1 tablet by mouth every 6 hours as needed for other (For mild pain or temperature greater than 102F). 50 tablet 0     lidocaine (XYLOCAINE) 2 % solution Take 3 mLs by mouth every 4 hours as needed for pain (apply to anus for pain.). swish and spit every 3-8 hours as needed; max 8 doses/24 hour period (Patient not taking: Reported on 11/20/2018) 100 mL 0     NO ACTIVE MEDICATIONS        oxyCODONE-acetaminophen (PERCOCET) 5-325 MG per tablet Take 1-2 tablets by mouth every 4 hours as needed for pain. (Patient not taking: Reported on 11/20/2018) 30 tablet 0     senna-docusate (SENOKOT-S;PERICOLACE) 8.6-50 MG per tablet Take 1-2 tablets by mouth 2 times daily as needed for constipation. 30 tablet 0       Allergies:  Allergies   Allergen Reactions     Ivp Dye [Contrast Dye]        Family history:  Family History   Problem Relation Age of Onset     Diabetes Brother      Diabetes Brother        Social history:  Social History     Tobacco Use     Smoking status: Light Tobacco Smoker     Types: Cigarettes     Smokeless tobacco: Never Used     Tobacco comment: 1 to 2  cigarettes/day. 2nd hand smoke exposure from roommates.   Substance Use Topics     Alcohol use: No     Marital status: single.      Total face to face time was 25 minutes, >50% counseling.    MARIANGEL Bess, NP-C  Colon and Rectal Surgery  [facility]    This note was created using  speech recognition software and may contain unintended word substitutions.

## 2019-04-18 ENCOUNTER — TELEPHONE (OUTPATIENT)
Dept: GASTROENTEROLOGY | Facility: CLINIC | Age: 44
End: 2019-04-18

## 2019-05-30 ENCOUNTER — ANCILLARY PROCEDURE (OUTPATIENT)
Dept: MRI IMAGING | Facility: CLINIC | Age: 44
End: 2019-05-30
Attending: NURSE PRACTITIONER
Payer: COMMERCIAL

## 2019-05-30 DIAGNOSIS — K60.30 ANAL FISTULA: ICD-10-CM

## 2019-05-30 RX ORDER — GADOBUTROL 604.72 MG/ML
10 INJECTION INTRAVENOUS ONCE
Status: ACTIVE | OUTPATIENT
Start: 2019-05-30

## 2019-06-20 DIAGNOSIS — K61.1 PERIRECTAL ABSCESS: Primary | ICD-10-CM

## 2019-06-21 ENCOUNTER — TELEPHONE (OUTPATIENT)
Dept: GASTROENTEROLOGY | Facility: CLINIC | Age: 44
End: 2019-06-21

## 2019-06-25 ENCOUNTER — TELEPHONE (OUTPATIENT)
Dept: GASTROENTEROLOGY | Facility: CLINIC | Age: 44
End: 2019-06-25

## 2019-11-08 ENCOUNTER — HEALTH MAINTENANCE LETTER (OUTPATIENT)
Age: 44
End: 2019-11-08

## 2019-12-17 NOTE — PROGRESS NOTES
Colon and Rectal Surgery Clinic Note      RE: Herson Barlow  : 1975  MELISSA: 2019    Herson Barlow is a very pleasant 44 year old male with complex perianal fistula. He initially had a necrotizing perirectal abscess in  complicated by ARF thought secondary to dehydration, ibuprofen use, and IV contrast. I last saw him in the OR on 2018 with replacement of seton and a secondary fistula opening that seemed to tract toward the primary fistula site but did not track into the anal canal itself.     Interval history: Mr. Barlow went to the ER on 19 with increased swelling and pain near his seton. CT showed fistula within the fistula tract and a thin 4.5 cm long perirectal abscess. He was given Cipro and Flagyl for this.  He saw Althea Grover NP in clinic in April and was doing better at that time but she noted what appeared to be another fistula tract. She recommended a repeat MRI and colonoscopy to rule out Crohn's.     MRI 19:  1. The patient had a difficult time lying flat, and the examination  was terminated prior to obtaining all images, most notably the  postcontrast images. A full evaluation was not completed.  2. 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 There is significant T2 hyperintensity an inflammation  throughout the tract, with peripheral fibrosis. As above, postcontrast  imaging was not performed.  Seton is in place.  3. No additional fistulae and no associated abscess.    No family history of any inflammatory bowel disease.    Interval History:  Since May, Herson has had 3-4 episodes of the left perianal area swelling, causing pain, and then draining on its own. No associated fevers. No discomfort from the seton.  He additionally reports testicular pain and pain with ejaculation. No difficulty voiding. He reportedly saw a urologist for this.  "  Bowel movements are anywhere from soft to hard. No diarrhea. He feels like he has to strain with bowel movements even when they are soft.   He does not want to get a colonoscopy as he has a friend who had a perforation on colonoscopy.    Physical examination:  Examination was chaperoned by Veronica Velazquez, EMT     Vitals: BP (!) 157/94 (BP Location: Left arm, Patient Position: Sitting, Cuff Size: Adult Regular)   Pulse 68   Temp 97.9  F (36.6  C) (Oral)   Ht 5' 9\"   Wt 225 lb 11.2 oz   SpO2 99%   BMI 33.33 kg/m    BMI= Body mass index is 33.33 kg/m .    Alert, oriented, in no acute distress, sitting comfortably. Yellow vessel loop seton in the left posterior position. Secondary fistula opening in the left lateral position without induration or erythema.    Laboratory data:    Recent Labs   Lab Test 04/04/19 1945  06/27/12  0620   WBC 16.0*   < > 28.1*   HGB 14.0   < > 14.0      < > 375   CR 0.92   < > 0.90   INR  --   --  1.25*    < > = values in this interval not displayed.       Assessment/plan:  I had a long discussion with Kristysommer regarding his complicated fistula. We discussed repeating MRI but he does not feel that he moved during the exam and does not want any contrast given prior kidney injury in 2012. Also discussed colonoscopy to rule out Crohn's disease but he is resistant to this. Since this has really been one complex fistula, this is not completely necessary and we could do a flexible sigmoidoscopy instead to rule out any inflammation. Recommended examination under anesthesia with possibly opening the tract between the two external openings and possible placement of a second seton. Will do a flexible sigmoidoscopy at the same time. Patient's questions were answered to his stated satisfaction and he is in agreement with this plan.    Total face to face time was 15 minutes, >50% counseling.    For details of past medical history, surgical history, family history, medications, " allergies, and review of systems, please see details below.    Medical history:  Past Medical History:   Diagnosis Date     External hemorrhoids with complication      Tobacco use disorder     Smokes 1 to 2 cigs/day. Started at 26 years       Surgical history:  Past Surgical History:   Procedure Laterality Date     EXAM UNDER ANESTHESIA ANUS N/A 12/7/2018    Procedure: EXAM UNDER ANESTHESIA ANUS;  Surgeon: Bird Moreland MD;  [facility]     FISTULOTOMY RECTUM  1/23/2013    Procedure: FISTULOTOMY RECTUM;  Rectal Exam, Fistulotomy, Seton Placement;  Surgeon: Bird Moreland MD;  [facility]     HC HEMORRHOIDECTOMY,INT/EXT,COMPLX  9/21/09     HEMORRHOID SURGERY  2009     INCISION AND DRAINAGE PERINEAL, COMBINED  6/29/2012    Procedure: COMBINED INCISION AND DRAINAGE PERINEAL;  Incision and drainage of buttocks wound with wash out  of buttocks wound;  Surgeon: Jessica Olvera MD;  [facility]     INCISION AND DRAINAGE RECTUM, COMBINED  12/25/2012    Procedure: COMBINED INCISION AND DRAINAGE RECTUM;  Exam Under Anesthesia, drainage of deep post anal abcess;  Surgeon: Bird Moreland MD;  [facility]     IRRIGATION AND DEBRIDEMENT RECTUM, COMBINED  6/27/2012    Procedure: COMBINED IRRIGATION AND DEBRIDEMENT RECTUM;  Irrigation and Debridement Rectal Abscess;  Surgeon: Jessica Olvera MD;  [facility]     PLACEMENT OF SETON RECTUM N/A 12/7/2018    Procedure: PLACEMENT OF SETON RECTUM;  Surgeon: Bird Moreland MD;  [facility]       Family history:  Family History   Problem Relation Age of Onset     Diabetes Brother      Diabetes Brother        Medications:  Current Outpatient Medications   Medication Sig Dispense Refill     ibuprofen (ADVIL,MOTRIN) 600 MG tablet Take 1 tablet by mouth every 6 hours as needed for other (For mild pain or temperature greater than 102F). 50 tablet 0     senna-docusate (SENOKOT-S;PERICOLACE) 8.6-50 MG per tablet Take 1-2 tablets by mouth 2 times  "daily as needed for constipation. 30 tablet 0     lidocaine (XYLOCAINE) 2 % solution Take 3 mLs by mouth every 4 hours as needed for pain (apply to anus for pain.). swish and spit every 3-8 hours as needed; max 8 doses/24 hour period (Patient not taking: Reported on 11/20/2018) 100 mL 0     NO ACTIVE MEDICATIONS        oxyCODONE-acetaminophen (PERCOCET) 5-325 MG per tablet Take 1-2 tablets by mouth every 4 hours as needed for pain. (Patient not taking: Reported on 11/20/2018) 30 tablet 0       Allergies:  The patientis allergic to ivp dye [contrast dye].    Social history:  Social History     Tobacco Use     Smoking status: Light Tobacco Smoker     Types: Cigarettes     Smokeless tobacco: Never Used     Tobacco comment: 1 to 2  cigarettes/day. 2nd hand smoke exposure from roommates.   Substance Use Topics     Alcohol use: No     Marital status: single.    Review of Systems:  Nursing Notes:   Boo Oliva LPN  12/24/2019 10:41 AM  Signed  Chief Complaint   Patient presents with     RECHECK     follow up perianal abscess       Vitals:    12/24/19 1036   BP: (!) 157/94   BP Location: Left arm   Patient Position: Sitting   Cuff Size: Adult Regular   Pulse: 68   Temp: 97.9  F (36.6  C)   TempSrc: Oral   SpO2: 99%   Weight: 225 lb 11.2 oz   Height: 5' 9\"       Body mass index is 33.33 kg/m .      JEREMY Merida MD   Professor and Chief  Division of Colon and Rectal Surgery  [facility]      Referring Provider:  Referred Self, MD  No address on file     Primary Care Provider:  [facility]    This note was created using speech recognition software and may contain unintended word substitutions.  "

## 2019-12-24 ENCOUNTER — OFFICE VISIT (OUTPATIENT)
Dept: SURGERY | Facility: CLINIC | Age: 44
End: 2019-12-24
Payer: COMMERCIAL

## 2019-12-24 VITALS
TEMPERATURE: 97.9 F | HEART RATE: 68 BPM | OXYGEN SATURATION: 99 % | HEIGHT: 69 IN | DIASTOLIC BLOOD PRESSURE: 94 MMHG | BODY MASS INDEX: 33.43 KG/M2 | WEIGHT: 225.7 LBS | SYSTOLIC BLOOD PRESSURE: 157 MMHG

## 2019-12-24 DIAGNOSIS — K61.1 PERIRECTAL ABSCESS: ICD-10-CM

## 2019-12-24 DIAGNOSIS — K60.30 ANAL FISTULA: Primary | ICD-10-CM

## 2019-12-24 ASSESSMENT — MIFFLIN-ST. JEOR: SCORE: 1909.15

## 2019-12-24 ASSESSMENT — PAIN SCALES - GENERAL: PAINLEVEL: NO PAIN (0)

## 2019-12-24 NOTE — LETTER
2019       RE: Herson Barlow  [address]  [facility] [address]     Dear Colleague,    Thank you for referring your patient, Herson Barlow, to the [facility] COLON AND RECTAL SURGERY at [facility]. Please see a copy of my visit note below.    Colon and Rectal Surgery Clinic Note    RE: Herson Barlow  : 1975  MELISSA: 2019    Herson Barlow is a very pleasant 44 year old male with complex perianal fistula. He initially had a necrotizing perirectal abscess in  complicated by ARF thought secondary to dehydration, ibuprofen use, and IV contrast. I last saw him in the OR on 2018 with replacement of seton and a secondary fistula opening that seemed to tract toward the primary fistula site but did not track into the anal canal itself.     Interval history: Mr. Barlow went to the ER on 19 with increased swelling and pain near his seton. CT showed fistula within the fistula tract and a thin 4.5 cm long perirectal abscess. He was given Cipro and Flagyl for this.   He saw Althea Grover NP in clinic in April and was doing better at that time but she noted what appeared to be another fistula tract. She recommended a repeat MRI and colonoscopy to rule out Crohn's.     MRI 19:  1. The patient had a difficult time lying flat, and the examination  was terminated prior to obtaining all images, most notably the  postcontrast images. A full evaluation was not completed.  2. 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 There is significant T2 hyperintensity an inflammation  throughout the tract, with peripheral fibrosis. As above, postcontrast  imaging was not performed.  Seton is in place.  3. No additional fistulae and no associated abscess.    No family history of any inflammatory bowel disease.    Interval History:  Since May,  "Herson has had 3-4 episodes of the left perianal area swelling, causing pain, and then draining on its own. No associated fevers. No discomfort from the seton.  He additionally reports testicular pain and pain with ejaculation. No difficulty voiding. He reportedly saw a urologist for this.   Bowel movements are anywhere from soft to hard. No diarrhea. He feels like he has to strain with bowel movements even when they are soft.   He does not want to get a colonoscopy as he has a friend who had a perforation on colonoscopy.    Physical examination:  Examination was chaperoned by Veronica Velazquez, EMT     Vitals: BP (!) 157/94 (BP Location: Left arm, Patient Position: Sitting, Cuff Size: Adult Regular)   Pulse 68   Temp 97.9  F (36.6  C) (Oral)   Ht 5' 9\"   Wt 225 lb 11.2 oz   SpO2 99%   BMI 33.33 kg/m     BMI= Body mass index is 33.33 kg/m .    Alert, oriented, in no acute distress, sitting comfortably. Yellow vessel loop seton in the left posterior position. Secondary fistula opening in the left lateral position without induration or erythema.    Laboratory data:    Recent Labs   Lab Test 04/04/19  1945  06/27/12  0620   WBC 16.0*   < > 28.1*   HGB 14.0   < > 14.0      < > 375   CR 0.92   < > 0.90   INR  --   --  1.25*    < > = values in this interval not displayed.       Assessment/plan:  I had a long discussion with Shanti regarding his complicated fistula. We discussed repeating MRI but he does not feel that he moved during the exam and does not want any contrast given prior kidney injury in 2012. Also discussed colonoscopy to rule out Crohn's disease but he is resistant to this. Since this has really been one complex fistula, this is not completely necessary and we could do a flexible sigmoidoscopy instead to rule out any inflammation. Recommended examination under anesthesia with possibly opening the tract between the two external openings and possible placement of a second seton. Will do a " flexible sigmoidoscopy at the same time. Patient's questions were answered to his stated satisfaction and he is in agreement with this plan.    Total face to face time was 15 minutes, >50% counseling.    For details of past medical history, surgical history, family history, medications, allergies, and review of systems, please see details below.    Medical history:  Past Medical History:   Diagnosis Date     External hemorrhoids with complication      Tobacco use disorder     Smokes 1 to 2 cigs/day. Started at 26 years       Surgical history:  Past Surgical History:   Procedure Laterality Date     EXAM UNDER ANESTHESIA ANUS N/A 12/7/2018    Procedure: EXAM UNDER ANESTHESIA ANUS;  Surgeon: Bird Moreland MD;  [facility]     FISTULOTOMY RECTUM  1/23/2013    Procedure: FISTULOTOMY RECTUM;  Rectal Exam, Fistulotomy, Seton Placement;  Surgeon: Bird Moreland MD;  [facility] OR     HC HEMORRHOIDECTOMY,INT/EXT,COMPLX  9/21/09     HEMORRHOID SURGERY  2009     INCISION AND DRAINAGE PERINEAL, COMBINED  6/29/2012    Procedure: COMBINED INCISION AND DRAINAGE PERINEAL;  Incision and drainage of buttocks wound with wash out  of buttocks wound;  Surgeon: Jessica Olvera MD;  [facility]     INCISION AND DRAINAGE RECTUM, COMBINED  12/25/2012    Procedure: COMBINED INCISION AND DRAINAGE RECTUM;  Exam Under Anesthesia, drainage of deep post anal abcess;  Surgeon: Bird Moreland MD;  [facility]     IRRIGATION AND DEBRIDEMENT RECTUM, COMBINED  6/27/2012    Procedure: COMBINED IRRIGATION AND DEBRIDEMENT RECTUM;  Irrigation and Debridement Rectal Abscess;  Surgeon: Jessica Olvera MD;  [facility] OR     PLACEMENT OF SETON RECTUM N/A 12/7/2018    Procedure: PLACEMENT OF SETON RECTUM;  Surgeon: Bird Moreland MD;  [facility]       Family history:  Family History   Problem Relation Age of Onset     Diabetes Brother      Diabetes Brother        Medications:  Current Outpatient Medications  "  Medication Sig Dispense Refill     ibuprofen (ADVIL,MOTRIN) 600 MG tablet Take 1 tablet by mouth every 6 hours as needed for other (For mild pain or temperature greater than 102F). 50 tablet 0     senna-docusate (SENOKOT-S;PERICOLACE) 8.6-50 MG per tablet Take 1-2 tablets by mouth 2 times daily as needed for constipation. 30 tablet 0     lidocaine (XYLOCAINE) 2 % solution Take 3 mLs by mouth every 4 hours as needed for pain (apply to anus for pain.). swish and spit every 3-8 hours as needed; max 8 doses/24 hour period (Patient not taking: Reported on 11/20/2018) 100 mL 0     NO ACTIVE MEDICATIONS        oxyCODONE-acetaminophen (PERCOCET) 5-325 MG per tablet Take 1-2 tablets by mouth every 4 hours as needed for pain. (Patient not taking: Reported on 11/20/2018) 30 tablet 0       Allergies:  The patientis allergic to ivp dye [contrast dye].    Social history:  Social History     Tobacco Use     Smoking status: Light Tobacco Smoker     Types: Cigarettes     Smokeless tobacco: Never Used     Tobacco comment: 1 to 2  cigarettes/day. 2nd hand smoke exposure from roommates.   Substance Use Topics     Alcohol use: No     Marital status: single.    Review of Systems:  Nursing Notes:   Boo Oliva LPN  12/24/2019 10:41 AM  Signed  Chief Complaint   Patient presents with     RECHECK     follow up perianal abscess       Vitals:    12/24/19 1036   BP: (!) 157/94   BP Location: Left arm   Patient Position: Sitting   Cuff Size: Adult Regular   Pulse: 68   Temp: 97.9  F (36.6  C)   TempSrc: Oral   SpO2: 99%   Weight: 225 lb 11.2 oz   Height: 5' 9\"       Body mass index is 33.33 kg/m .      JEREMY Merida MD   Professor and Chief  Division of Colon and Rectal Surgery  [facility]    Referring Provider:  Referred Self, MD  No address on file     Primary Care Provider:  Clinic, [facility]    This note was created using speech recognition software and may contain " unintended word substitutions.

## 2019-12-24 NOTE — PATIENT INSTRUCTIONS
Follow up:  1. Schedule Procedure (You will receive a call to schedule)    Please call with any questions or concerns regarding your clinic visit today.    It is a pleasure being involved in your health care.    Contacts post-consultation depending on your need:    Radiology Appointments [phone]    Schedule Clinic Appointments [phone] # 1   M-F 7:30 - 5 pm    ASHA Bowers [phone] or ASHA Childress [phone]    Clinic Fax Number [phone]    Surgery Scheduling [phone]    My Chart is available 24 hours a day and is a secure way to access your records and communicate with your care team.  I strongly recommend signing up if you haven't already done so, if you are comfortable with computers.  If you would like to inquire about this or are having problems with My Chart access, you may call [phone] or go online at [email or web address].  Please allow at least 24 hours for a response and extra time on weekends and Holidays.

## 2019-12-24 NOTE — NURSING NOTE
"Chief Complaint   Patient presents with     RECHECK     follow up perianal abscess       Vitals:    12/24/19 1036   BP: (!) 157/94   BP Location: Left arm   Patient Position: Sitting   Cuff Size: Adult Regular   Pulse: 68   Temp: 97.9  F (36.6  C)   TempSrc: Oral   SpO2: 99%   Weight: 225 lb 11.2 oz   Height: 5' 9\"       Body mass index is 33.33 kg/m .      Boo Oliva LPN                        "

## 2020-01-07 ENCOUNTER — TELEPHONE (OUTPATIENT)
Dept: SURGERY | Facility: CLINIC | Age: 45
End: 2020-01-07

## 2020-01-07 NOTE — TELEPHONE ENCOUNTER
1/7/2020:  Calling with Debbie , phone call to patient at provided number,  left voicemail message for return call, explaining that I will get an  on the line when he calls back.

## 2020-01-15 NOTE — TELEPHONE ENCOUNTER
1/15/2020:  Called patient with a Citizen of [place] , patient responded in fluent English, states that he is in [place] until next week, should be back in [place] on Wednesday, at which point he will call me back to schedule his surgery. Awaiting patient's call.

## 2020-02-12 NOTE — TELEPHONE ENCOUNTER
Last time I spoke with patient, he said he'd call me back after he got back from [place], which was in mid-January. I have not received a return call or voicemail message from patient.    2/12/2020: phone call to patient at provided number, the mailbox is full, unable to leave voicemail message. I sent patient a Palkion message asking that he please call me at his earliest convenience to discuss scheduling his surgery.    Since this is my 3rd phone call attempt, and I also sent a [email or web address] message, I will cease attempts to locate and contact patient regarding surgery scheduling. If he calls back, then I will assist him with scheduling as per usual.

## 2020-02-14 ENCOUNTER — HOSPITAL ENCOUNTER (OUTPATIENT)
Facility: AMBULATORY SURGERY CENTER | Age: 45
End: 2020-02-14
Attending: COLON & RECTAL SURGERY
Payer: COMMERCIAL

## 2020-02-14 ENCOUNTER — TELEPHONE (OUTPATIENT)
Dept: SURGERY | Facility: CLINIC | Age: 45
End: 2020-02-14

## 2020-02-14 NOTE — TELEPHONE ENCOUNTER
Patient is scheduled for surgery with Dr. Moreland    Spoke with Herson    Date of Surgery: April 10, 2020    [facility]: [facility]    Informed patient they will need an adult  Yes    H&P: Scheduled with PAC vs. PCP*  *patient states he will attempt to schedule with his PCP, but may just decide to come here to the [facility] - he has not decided yet. He said to call him mid-week next week, and he should know by then. I did stress that he must have a Pre-op physical either here or at PCP or surgery will not move forward. Patient expressed understanding.    Post-op appointment with Althea Grover NP, on 4/27/2020 at 3:00 pm    Surgery packet: will mail and send via [employer]     Additional comments: awaiting Pre-op info

## 2020-03-18 NOTE — TELEPHONE ENCOUNTER
3/18/2020:  Phone call to patient to ensure he was able to get in to his PCP for a pre-op physical. Patient says when he called his PCP they were backed up, so he could not get in. However, when given the option to be scheduled here at the [facility] vs. Trying again to schedule with his PCP, he chose the latter. He stated that he'll call me back to confirm either way.

## 2020-03-27 ENCOUNTER — TELEPHONE (OUTPATIENT)
Dept: SURGERY | Facility: CLINIC | Age: 45
End: 2020-03-27

## 2020-03-30 NOTE — TELEPHONE ENCOUNTER
I notified patient that procedure was canceled due to COVID-19. I explained that we will call him when we are ready to resume scheduling.

## 2020-06-24 ENCOUNTER — TELEPHONE (OUTPATIENT)
Dept: SURGERY | Facility: CLINIC | Age: 45
End: 2020-06-24

## 2020-06-24 ENCOUNTER — HOSPITAL ENCOUNTER (OUTPATIENT)
Facility: AMBULATORY SURGERY CENTER | Age: 45
End: 2020-06-24
Attending: COLON & RECTAL SURGERY
Payer: COMMERCIAL

## 2020-06-24 NOTE — TELEPHONE ENCOUNTER
Spoke with patient regarding scheduling his procedure. Patient states he is leaving for vacation in [place] until after 7/4. Patient states he wants to wait to schedule until after his trip, and states he will call us back at that point.

## 2020-07-17 ENCOUNTER — TELEPHONE (OUTPATIENT)
Dept: SURGERY | Facility: CLINIC | Age: 45
End: 2020-07-17

## 2020-07-17 DIAGNOSIS — K61.1 PERIRECTAL ABSCESS: Primary | ICD-10-CM

## 2020-07-17 NOTE — TELEPHONE ENCOUNTER
Patient is scheduled for surgery with Dr. Moreland    Spoke with Herson    Date of Surgery: 8/28/2020    Location: [facility]    Informed patient they will need an adult  Yes    COVID-19 test scheduled at the [facility] on 8/26/2020 at 10:00 am    H&P: Scheduled with PAC on 8/26/2020 at 10:45 am    Post-op with Althea Grover NP, on 9/18/2020 at 10:00 am    Surgery packet: will send via [employer] and Mail     Additional Comments:  - 10:00 am had been requested, and Ayla in OR scheduling thinks she will be able to move the case up soon (currently at 11:00 am)

## 2020-07-17 NOTE — TELEPHONE ENCOUNTER
"Left voicemail message to call back regarding scheduling. Also sent MyChart message to respond regarding scheduling.    After 3 call attempts, and 1 MyChart attempt, will at this point \"done\" patient's Case Request. However, if he responds to schedule, then we will assist with scheduling at that point.  "

## 2020-07-20 NOTE — TELEPHONE ENCOUNTER
FUTURE VISIT INFORMATION      SURGERY INFORMATION:    Date: 8/28/20    [facility]    Surgeon:  Bird Moreland MD    Anesthesia Type:  MAC    Procedure: EXAM UNDER ANESTHESIA, ANUS PLACEMENT, SETON STITCH FISTULOTOMY, RECTUM SIGMOIDOSCOPY, FLEXIBLE     RECORDS REQUESTED FROM:       Primary Care Provider: Ronda

## 2020-08-25 ENCOUNTER — VIRTUAL VISIT (OUTPATIENT)
Dept: SURGERY | Facility: CLINIC | Age: 45
End: 2020-08-25
Payer: COMMERCIAL

## 2020-08-25 ENCOUNTER — TELEPHONE (OUTPATIENT)
Dept: SURGERY | Facility: CLINIC | Age: 45
End: 2020-08-25

## 2020-08-25 ENCOUNTER — APPOINTMENT (OUTPATIENT)
Dept: INTERPRETER SERVICES | Facility: CLINIC | Age: 45
End: 2020-08-25
Payer: COMMERCIAL

## 2020-08-25 VITALS — HEIGHT: 69 IN | WEIGHT: 223 LBS | BODY MASS INDEX: 33.03 KG/M2

## 2020-08-25 DIAGNOSIS — K60.30 ANAL FISTULA: Primary | ICD-10-CM

## 2020-08-25 ASSESSMENT — MIFFLIN-ST. JEOR: SCORE: 1891.9

## 2020-08-25 NOTE — TELEPHONE ENCOUNTER
Called patient to offer video/telephone appointment with Dr. Moreland. [facility] with callback number [phone]. Will attempt to call again later.    Patient called back and confirmed appointment today anytime after 2:00 pm.    Veronica Monterroso, EMT  Colon and Rectal Surgery

## 2020-08-25 NOTE — PROGRESS NOTES
"Colon and Rectal Surgery Clinic Note      RE: Herson Barlow  : 1975  MELISSA: 2020    Herson Barlow is a 45 year old male who is being evaluated via a billable telephone visit.      The patient has been notified of following:     \"This telephone visit will be conducted via a call between you and your physician/provider. We have found that certain health care needs can be provided without the need for a physical exam.  This service lets us provide the care you need with a short phone conversation.  If a prescription is necessary we can send it directly to your pharmacy.  If lab work is needed we can place an order for that and you can then stop by our lab to have the test done at a later time.    If during the course of the call the physician/provider feels a telephone visit is not appropriate, you will not be charged for this service.\"     Patient has given verbal consent for telephone visit? 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     HPI:    He initially had a necrotizing perirectal abscess in  complicated by ARF thought secondary to dehydration, ibuprofen use, and IV contrast.     I him in the OR on 2018 with replacement of seton and a secondary fistula opening that seemed to tract toward the primary fistula site but did not track into the anal canal itself.    He went to the ER on 19 with increased swelling and pain near his seton. CT showed fistula within the fistula tract and a thin 4.5 cm long perirectal abscess. He was given Cipro and Flagyl for this.      He saw Althea Grover NP in clinic in April and was doing better at that time but she noted what appeared to be another fistula tract. She recommended a repeat MRI and colonoscopy to rule out Crohn's.     MRI 19:  1. The patient had a difficult time lying flat, and the examination  was terminated " prior to obtaining all images, most notably the  postcontrast images. A full evaluation was not completed.  2. Complex transsphincteric fistula arises from the posterior  left aspect of the internal sphincter, approximately 2.5 cm above the  anal aperture. There is significant T2 hyperintensity an inflammation  throughout the tract, with peripheral fibrosis. As above, postcontrast  imaging was not performed.  Seton is in place.  3. No additional fistulae and no associated abscess.    He declined colonoscopy as he had a friend who had a perforation on colonoscopy    No family history of any inflammatory bowel disease.    My clinic exam 12/24/2019:  Alert, oriented, in no acute distress, sitting comfortably. Yellow vessel loop seton in the left posterior position. Secondary fistula opening in the left lateral position without induration or erythema.    Exam under anesthesia with fistulotomy and possible seton placement and intraoperative flexible sigmoidoscopy was planned but the patient never proceeded with scheduling.    Interval History: Katlyn reports that he is having recurrent swelling and drainage at his fistula site.  He reports that the current seton is comfortable but that the drainage seems to be emerging from the secondary tract lateral to the seton rather than from the seton exit site itself.  Otherwise he is feeling well and healthy.  He denies other significant medical problems.    Laboratory data:    Recent Labs   Lab Test 04/04/19  1945  06/27/12  0620   WBC 16.0*   < > 28.1*   HGB 14.0   < > 14.0      < > 375   CR 0.92   < > 0.90   INR  --   --  1.25*    < > = values in this interval not displayed.       Assessment/plan: I reviewed the surgical plan for later this week.  I will do an exam under anesthesia and a flexible sigmoidoscopy.  My best guess is that there is a solitary cavity underlying the seton exit site in the more lateral secondary opening and I will leave that open.  However, it is  possible that Katlyn will require a second seton.  We reviewed the risks and alternatives to surgery in detail.  I discussed the specific risk of alterations in continence related to sphincter surgery and especially fistulotomy.  I answered all of Katlyn's questions to his stated satisfaction.  He expressed his understanding and agreement.    Total time 12 minutes.  Greater than half the time was spent counseling.      For details of past medical history, surgical history, family history, medications, allergies, and review of systems, please see details below.    Medical history:  Past Medical History:   Diagnosis Date     External hemorrhoids with complication      Tobacco use disorder     Smokes 1 to 2 cigs/day. Started at 26 years       Surgical history:  Past Surgical History:   Procedure Laterality Date     EXAM UNDER ANESTHESIA ANUS N/A 12/7/2018    Procedure: EXAM UNDER ANESTHESIA ANUS;  Surgeon: Bird Moreland MD;  [facility]     FISTULOTOMY RECTUM  1/23/2013    Procedure: FISTULOTOMY RECTUM;  Rectal Exam, Fistulotomy, Seton Placement;  Surgeon: Bird Moreland MD;  [facility]     HC HEMORRHOIDECTOMY,INT/EXT,COMPLX  9/21/09     HEMORRHOID SURGERY  2009     INCISION AND DRAINAGE PERINEAL, COMBINED  6/29/2012    Procedure: COMBINED INCISION AND DRAINAGE PERINEAL;  Incision and drainage of buttocks wound with wash out  of buttocks wound;  Surgeon: Jessica Olvera MD;  [facility]     INCISION AND DRAINAGE RECTUM, COMBINED  12/25/2012    Procedure: COMBINED INCISION AND DRAINAGE RECTUM;  Exam Under Anesthesia, drainage of deep post anal abcess;  Surgeon: Bird Moreland MD;  [facility]     IRRIGATION AND DEBRIDEMENT RECTUM, COMBINED  6/27/2012    Procedure: COMBINED IRRIGATION AND DEBRIDEMENT RECTUM;  Irrigation and Debridement Rectal Abscess;  Surgeon: Jessica Olvera MD;  [facility]     PLACEMENT OF SETON RECTUM N/A 12/7/2018    Procedure: PLACEMENT OF SETON RECTUM;   Surgeon: Bird Moreland MD;  [facility]       Family history:  Family History   Problem Relation Age of Onset     Diabetes Brother      Diabetes Brother        Medications:  Current Outpatient Medications   Medication Sig Dispense Refill     ibuprofen (ADVIL,MOTRIN) 600 MG tablet Take 1 tablet by mouth every 6 hours as needed for other (For mild pain or temperature greater than 102F). 50 tablet 0     lidocaine (XYLOCAINE) 2 % solution Take 3 mLs by mouth every 4 hours as needed for pain (apply to anus for pain.). swish and spit every 3-8 hours as needed; max 8 doses/24 hour period (Patient not taking: Reported on 11/20/2018) 100 mL 0     NO ACTIVE MEDICATIONS        oxyCODONE-acetaminophen (PERCOCET) 5-325 MG per tablet Take 1-2 tablets by mouth every 4 hours as needed for pain. (Patient not taking: Reported on 11/20/2018) 30 tablet 0     senna-docusate (SENOKOT-S;PERICOLACE) 8.6-50 MG per tablet Take 1-2 tablets by mouth 2 times daily as needed for constipation. 30 tablet 0       Allergies:  The patientis allergic to ivp dye [contrast dye].    Social history:  Social History     Tobacco Use     Smoking status: Light Tobacco Smoker     Types: Cigarettes     Smokeless tobacco: Never Used     Tobacco comment: 1 to 2  cigarettes/day. 2nd hand smoke exposure from roommates.   Substance Use Topics     Alcohol use: No     Marital status: single.    Review of Systems:  There are no exam notes on file for this visit.         Bird Moreland MD   Professor and Chief  Division of Colon and Rectal Surgery  [facility]      Referring Provider:  No referring provider defined for this encounter.     Primary Care Provider:  [facility]    This note was created using speech recognition software and may contain unintended word substitutions.

## 2020-08-25 NOTE — LETTER
"2020     RE: Herson Barlow  [address]  [facility] [address]     Dear Colleague,    Thank you for referring your patient, Herson Barlow, to the [facility] COLON AND RECTAL SURGERY at [facility]. Please see a copy of my visit note below.    Colon and Rectal Surgery Clinic Note      RE: Herson Barlow  : 1975  MELISSA: 2020    Herson Barlow is a 45 year old male who is being evaluated via a billable telephone visit.      The patient has been notified of following:     \"This telephone visit will be conducted via a call between you and your physician/provider. We have found that certain health care needs can be provided without the need for a physical exam.  This service lets us provide the care you need with a short phone conversation.  If a prescription is necessary we can send it directly to your pharmacy.  If lab work is needed we can place an order for that and you can then stop by our lab to have the test done at a later time.    If during the course of the call the physician/provider feels a telephone visit is not appropriate, you will not be charged for this service.\"     Patient has given verbal consent for telephone visit? 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     HPI:    He initially had a necrotizing perirectal abscess in  complicated by ARF thought secondary to dehydration, ibuprofen use, and IV contrast.     I him in the OR on 2018 with replacement of seton and a secondary fistula opening that seemed to tract toward the primary fistula site but did not track into the anal canal itself.    He went to the ER on 19 with increased swelling and pain near his seton. CT showed fistula within the fistula tract and a thin 4.5 cm long perirectal abscess. He was given Cipro and Flagyl for this.      He saw Althea marroquin" MIKE Neff in clinic in April and was doing better at that time but she noted what appeared to be another fistula tract. She recommended a repeat MRI and colonoscopy to rule out Crohn's.     MRI 5/30/19:  1. The patient had a difficult time lying flat, and the examination  was terminated prior to obtaining all images, most notably the  postcontrast images. A full evaluation was not completed.  2. Complex transsphincteric fistula arises from the posterior  left aspect of the internal sphincter, approximately 2.5 cm above the  anal aperture. There is significant T2 hyperintensity an inflammation  throughout the tract, with peripheral fibrosis. As above, postcontrast  imaging was not performed.  Seton is in place.  3. No additional fistulae and no associated abscess.    He declined colonoscopy as he had a friend who had a perforation on colonoscopy    No family history of any inflammatory bowel disease.    My clinic exam 12/24/2019:  Alert, oriented, in no acute distress, sitting comfortably. Yellow vessel loop seton in the left posterior position. Secondary fistula opening in the left lateral position without induration or erythema.    Exam under anesthesia with fistulotomy and possible seton placement and intraoperative flexible sigmoidoscopy was planned but the patient never proceeded with scheduling.    Interval History: Katlyn reports that he is having recurrent swelling and drainage at his fistula site.  He reports that the current seton is comfortable but that the drainage seems to be emerging from the secondary tract lateral to the seton rather than from the seton exit site itself.  Otherwise he is feeling well and healthy.  He denies other significant medical problems.    Laboratory data:    Recent Labs   Lab Test 04/04/19 1945 06/27/12  0620   WBC 16.0*   < > 28.1*   HGB 14.0   < > 14.0      < > 375   CR 0.92   < > 0.90   INR  --   --  1.25*    < > = values in this interval not displayed.        Assessment/plan: I reviewed the surgical plan for later this week.  I will do an exam under anesthesia and a flexible sigmoidoscopy.  My best guess is that there is a solitary cavity underlying the seton exit site in the more lateral secondary opening and I will leave that open.  However, it is possible that Katlyn will require a second seton.  We reviewed the risks and alternatives to surgery in detail.  I discussed the specific risk of alterations in continence related to sphincter surgery and especially fistulotomy.  I answered all of Katlyn's questions to his stated satisfaction.  He expressed his understanding and agreement.    Total time 12 minutes.  Greater than half the time was spent counseling.      For details of past medical history, surgical history, family history, medications, allergies, and review of systems, please see details below.    Medical history:  Past Medical History:   Diagnosis Date     External hemorrhoids with complication      Tobacco use disorder     Smokes 1 to 2 cigs/day. Started at 26 years       Surgical history:  Past Surgical History:   Procedure Laterality Date     EXAM UNDER ANESTHESIA ANUS N/A 12/7/2018    Procedure: EXAM UNDER ANESTHESIA ANUS;  Surgeon: Bird Moreland MD;  [facility]     FISTULOTOMY RECTUM  1/23/2013    Procedure: FISTULOTOMY RECTUM;  Rectal Exam, Fistulotomy, Seton Placement;  Surgeon: Bird Moreland MD;  Location: [facility] OR     HC HEMORRHOIDECTOMY,INT/EXT,COMPLX  9/21/09     HEMORRHOID SURGERY  2009     INCISION AND DRAINAGE PERINEAL, COMBINED  6/29/2012    Procedure: COMBINED INCISION AND DRAINAGE PERINEAL;  Incision and drainage of buttocks wound with wash out  of buttocks wound;  Surgeon: Jessica Olvera MD;  [facility]: [facility] OR     INCISION AND DRAINAGE RECTUM, COMBINED  12/25/2012    Procedure: COMBINED INCISION AND DRAINAGE RECTUM;  Exam Under Anesthesia, drainage of deep post anal abcess;  Surgeon: Bird Moreland MD;  [facility]      IRRIGATION AND DEBRIDEMENT RECTUM, COMBINED  6/27/2012    Procedure: COMBINED IRRIGATION AND DEBRIDEMENT RECTUM;  Irrigation and Debridement Rectal Abscess;  Surgeon: Jessica Olvera MD;  Location: [facility] OR     PLACEMENT OF SETON RECTUM N/A 12/7/2018    Procedure: PLACEMENT OF SETON RECTUM;  Surgeon: Bird Moreland MD;  [facility] OR       Family history:  Family History   Problem Relation Age of Onset     Diabetes Brother      Diabetes Brother        Medications:  Current Outpatient Medications   Medication Sig Dispense Refill     ibuprofen (ADVIL,MOTRIN) 600 MG tablet Take 1 tablet by mouth every 6 hours as needed for other (For mild pain or temperature greater than 102F). 50 tablet 0     lidocaine (XYLOCAINE) 2 % solution Take 3 mLs by mouth every 4 hours as needed for pain (apply to anus for pain.). swish and spit every 3-8 hours as needed; max 8 doses/24 hour period (Patient not taking: Reported on 11/20/2018) 100 mL 0     NO ACTIVE MEDICATIONS        oxyCODONE-acetaminophen (PERCOCET) 5-325 MG per tablet Take 1-2 tablets by mouth every 4 hours as needed for pain. (Patient not taking: Reported on 11/20/2018) 30 tablet 0     senna-docusate (SENOKOT-S;PERICOLACE) 8.6-50 MG per tablet Take 1-2 tablets by mouth 2 times daily as needed for constipation. 30 tablet 0       Allergies:  The patientis allergic to ivp dye [contrast dye].    Social history:  Social History     Tobacco Use     Smoking status: Light Tobacco Smoker     Types: Cigarettes     Smokeless tobacco: Never Used     Tobacco comment: 1 to 2  cigarettes/day. 2nd hand smoke exposure from roommates.   Substance Use Topics     Alcohol use: No     Marital status: single.    Review of Systems:  There are no exam notes on file for this visit.       Bird Moreland MD   Professor and Chief  Division of Colon and Rectal Surgery  [facility]      Referring Provider:  No referring provider defined for this encounter.      [facility]    This note was created using speech recognition software and may contain unintended word substitutions.

## 2020-08-25 NOTE — NURSING NOTE
"Chief Complaint   Patient presents with     RECHECK     Discuss surgery       Vitals:    08/25/20 1402   Weight: 223 lb   Height: 5' 9\"       Body mass index is 32.93 kg/m .      Boo Oliva LPN                        "

## 2020-08-26 ENCOUNTER — PRE VISIT (OUTPATIENT)
Dept: SURGERY | Facility: CLINIC | Age: 45
End: 2020-08-26

## 2020-08-26 ENCOUNTER — ANESTHESIA EVENT (OUTPATIENT)
Dept: SURGERY | Facility: AMBULATORY SURGERY CENTER | Age: 45
End: 2020-08-26

## 2020-08-26 ENCOUNTER — TELEPHONE (OUTPATIENT)
Dept: SURGERY | Facility: CLINIC | Age: 45
End: 2020-08-26

## 2020-08-26 ENCOUNTER — OFFICE VISIT (OUTPATIENT)
Dept: SURGERY | Facility: CLINIC | Age: 45
End: 2020-08-26
Payer: COMMERCIAL

## 2020-08-26 VITALS
BODY MASS INDEX: 33.33 KG/M2 | WEIGHT: 225 LBS | RESPIRATION RATE: 16 BRPM | DIASTOLIC BLOOD PRESSURE: 86 MMHG | SYSTOLIC BLOOD PRESSURE: 125 MMHG | HEIGHT: 69 IN | HEART RATE: 71 BPM | OXYGEN SATURATION: 99 % | TEMPERATURE: 98.5 F

## 2020-08-26 DIAGNOSIS — Z01.818 PRE-OP EVALUATION: ICD-10-CM

## 2020-08-26 DIAGNOSIS — Z01.818 PRE-OP EVALUATION: Primary | ICD-10-CM

## 2020-08-26 DIAGNOSIS — K61.1 PERIRECTAL ABSCESS: ICD-10-CM

## 2020-08-26 LAB
CREAT SERPL-MCNC: 1.06 MG/DL (ref 0.66–1.25)
GFR SERPL CREATININE-BSD FRML MDRD: 85 ML/MIN/{1.73_M2}
HGB BLD-MCNC: 16.1 G/DL (ref 13.3–17.7)
POTASSIUM SERPL-SCNC: 4.1 MMOL/L (ref 3.4–5.3)

## 2020-08-26 ASSESSMENT — PAIN SCALES - GENERAL: PAINLEVEL: NO PAIN (0)

## 2020-08-26 ASSESSMENT — LIFESTYLE VARIABLES: TOBACCO_USE: 1

## 2020-08-26 ASSESSMENT — MIFFLIN-ST. JEOR: SCORE: 1900.97

## 2020-08-26 NOTE — PATIENT INSTRUCTIONS
Preparing for Your Surgery      Name:  Herson Barlow   MRN:  [id number]   :  1975   Today's Date:  2020         Arriving for surgery:  Surgery date:  20  Arrival time:  9:00 am    Restrictions due to COVID 19:  No visitors at the Surgery Center   parking is not available     Please come to:    [facility]  [address] [place] [id number]    Please check in on the 5th floor at the Ambulatory Surgery Center       What can I eat or drink?    -  You may eat and drink normally until 8 hours before surgery. (Until 2:00 am)  -  You may have clear liquids up to 4 hours before surgery. (Until 6:00 am)    Examples of clear liquids:  Water  Clear broth  Juices (apple, white grape, white cranberry  and cider) without pulp  Noncarbonated, powder based beverages  (lemonade and Simone-Aid)  Sodas (Sprite, 7-Up, ginger ale and seltzer)  Coffee or tea (without milk or cream)  Gatorade    --No alcohol for at least 24 hours before surgery    Which medicines can I take?    Hold aspirin for 7 days before surgery.   Hold multivitamins for 7 days before surgery.  Hold supplements for 7 days before surgery.  Hold Ibuprofen (Advil, Motrin) for 1 day before surgery--unless otherwise directed by surgeon.  Hold Naproxen (Aleve) for 4 days before surgery.      How do I prepare myself?  - Please take 2 showers before surgery using Scrubcare or Hibiclens soap.    Use this soap only from the neck to your toes.     Leave the soap on your skin for one minute--then rinse thoroughly.      You may use your own shampoo and conditioner; no other hair products.   - Please remove all jewelry and body piercings.  - No lotions, deodorants or fragrance.  - Bring your ID and insurance card.        - All patients are required to have a Covid-19 test within 4 days of surgery/procedure.      -Patients will be contacted by the [facility] scheduling team within 1 week of surgery to make an  appointment.      - Patients may call the Scheduling team at [phone] if they have not been scheduled within 4 days of  surgery.      ALL PATIENTS ARE REQUIRED TO HAVE A RESPONSIBLE ADULT TO DRIVE AND BE IN ATTENDANCE WITH THEM FOR 24 HOURS FOLLOWING SURGERY       Questions or Concerns:    -For questions regarding the day of surgery please contact the Ambulatory Surgery Center at [phone].    -If you have health changes between today and your surgery please contact your surgeon.     For questions after surgery please call your surgeons office.

## 2020-08-26 NOTE — H&P
Pre-Operative H & P     CC:  Preoperative exam to assess for increased cardiopulmonary risk while undergoing surgery and anesthesia.    Date of Encounter: 8/26/2020  Primary Care Physician:  Margaret, [facility]  associated diagnosis: perirectal abscess    HPI  Herson Barlow is a 45 year old male who presents for pre-operative H & P in preparation for EXAM UNDER ANESTHESIA, ANUS; PLACEMENT, SETON STITCH; FISTULOTOMY, RECTUM; SIGMOIDOSCOPY, FLEXIBLE with Dr. Moreland on 8/28/20 at [facility]. Patient is being evaluated for comorbid conditions of Patient is being evaluated for comorbid conditions of current tobacco use, hemorrhoids         Mr. Barlow has a history of necrotizing perirectal abscess in 2012 complicated by ARF thought to be secondary to dehydration, ibuprofen and IV contrast. He then underwent seton replacement and second fistula opening 12/2018.  He was seen in the ED 4/2019 with increased swelling and pain near seton and perirectal abscess. He was seen 8/25/20 by Dr. Moreland and reported recurrent swelling and drainage at fistula site. Above procedure is planned.       History is obtained from the patient and chart review.      Past Medical History  Past Medical History:   Diagnosis Date     Acute renal failure (H) 7/12/2012     External hemorrhoids with complication      Tobacco use disorder     Smokes 1 to 2 cigs/day. Started at 26 years       Past Surgical History  Past Surgical History:   Procedure Laterality Date     EXAM UNDER ANESTHESIA ANUS N/A 12/7/2018    Procedure: EXAM UNDER ANESTHESIA ANUS;  Surgeon: Bird Moreland MD;  [facility]     FISTULOTOMY RECTUM  1/23/2013    Procedure: FISTULOTOMY RECTUM;  Rectal Exam, Fistulotomy, Seton Placement;  Surgeon: Bird Moreland MD;  [facility]      HEMORRHOIDECTOMY,INT/EXT,COMPLX  9/21/09     HEMORRHOID SURGERY  2009     INCISION AND DRAINAGE PERINEAL, COMBINED  6/29/2012    Procedure: COMBINED  INCISION AND DRAINAGE PERINEAL;  Incision and drainage of buttocks wound with wash out  of buttocks wound;  Surgeon: Jessica Olvera MD;  [facility]     INCISION AND DRAINAGE RECTUM, COMBINED  12/25/2012    Procedure: COMBINED INCISION AND DRAINAGE RECTUM;  Exam Under Anesthesia, drainage of deep post anal abcess;  Surgeon: Bird Moreland MD;  [facility]     IRRIGATION AND DEBRIDEMENT RECTUM, COMBINED  6/27/2012    Procedure: COMBINED IRRIGATION AND DEBRIDEMENT RECTUM;  Irrigation and Debridement Rectal Abscess;  Surgeon: Jessica Olvera MD;  [facility]     PLACEMENT OF SETON RECTUM N/A 12/7/2018    Procedure: PLACEMENT OF SETON RECTUM;  Surgeon: Bird Moreland MD;  [facility]       Hx of Blood transfusions/reactions: denies     Hx of abnormal bleeding or anti-platelet use: denies    Menstrual history: No LMP for male patient.    Steroid use in the last year: denies    Personal or FH with difficulty with Anesthesia:  denies    Prior to Admission Medications  Current Outpatient Medications   Medication Sig Dispense Refill     NO ACTIVE MEDICATIONS          Allergies  Allergies   Allergen Reactions     Ivp Dye [Contrast Dye]        Social History  Social History     Socioeconomic History     Marital status: Single     Spouse name: Single     Number of children: 2     Years of education: Not on file     Highest education level: Not on file   Occupational History     Occupation:      Employer: [employer]     Comment: [employer]   Social Needs     Financial resource strain: Not on file     Food insecurity     Worry: Not on file     Inability: Not on file     Transportation needs     Medical: Not on file     Non-medical: Not on file   Tobacco Use     Smoking status: Light Tobacco Smoker     Types: Cigarettes     Smokeless tobacco: Never Used     Tobacco comment: 1 to 2  cigarettes/day. 2nd hand smoke exposure from roommates.   Substance and Sexual Activity      Alcohol use: No     Drug use: No     Sexual activity: Not on file   Lifestyle     Physical activity     Days per week: Not on file     Minutes per session: Not on file     Stress: Not on file   Relationships     Social connections     Talks on phone: Not on file     Gets together: Not on file     Attends Oriental orthodox service: Not on file     Active member of club or organization: Not on file     Attends meetings of clubs or organizations: Not on file     Relationship status: Not on file     Intimate partner violence     Fear of current or ex partner: Not on file     Emotionally abused: Not on file     Physically abused: Not on file     Forced sexual activity: Not on file   Other Topics Concern     Parent/sibling w/ CABG, MI or angioplasty before 65F 55M? Not Asked      Service Not Asked     Blood Transfusions Not Asked     Caffeine Concern Not Asked     Comment: [employer] -      Occupational Exposure Not Asked     Hobby Hazards Not Asked     Sleep Concern Not Asked     Stress Concern Not Asked     Weight Concern Not Asked     Special Diet Not Asked     Back Care Not Asked     Exercise Not Asked     Comment: Runs - 4 times a week  3 miles at a time     Bike Helmet Not Asked     Seat Belt Not Asked     Self-Exams Not Asked   Social History Narrative    ** Merged History Encounter **            Family History  Family History   Problem Relation Age of Onset     Diabetes Brother      Diabetes Brother      Anesthesia Reaction No family hx of      Deep Vein Thrombosis (DVT) No family hx of      ROS/MED HX    ENT/Pulmonary:     (+)tobacco use (1/2 cigarettes daily), , . .    Neurologic:  - neg neurologic ROS     Cardiovascular:  - neg cardiovascular ROS   (+) ----. : . . . :. . No previous cardiac testing      (-) taking anticoagulants/antiplatelets   METS/Exercise Tolerance:     Hematologic:  - neg hematologic  ROS      (-) History of Transfusion   Musculoskeletal:  - neg musculoskeletal ROS       GI/Hepatic:  "Comment: Perirectal abscess        Renal/Genitourinary: Comment: H/o ARF. No chronic kidney disease        Endo:  - neg endo ROS       Psychiatric:  - neg psychiatric ROS       Infectious Disease:  - neg infectious disease ROS       Malignancy:      - no malignancy   Other:             The complete review of systems is negative other than noted in the HPI or here.   Temp: 98.5  F (36.9  C) Temp src: Oral BP: 125/86 Pulse: 71   Resp: 16 SpO2: 99 %         225 lbs 0 oz  5' 9\"   Body mass index is 33.23 kg/m .       Physical Exam  Constitutional: Awake, alert, cooperative, no apparent distress, and appears stated age.  Eyes: Pupils equal, round and reactive to light, extra ocular muscles intact, sclera clear, conjunctiva normal.  HENT: Normocephalic, oral pharynx with moist mucus membranes, good dentition.   Respiratory: Clear to auscultation bilaterally, no crackles or wheezing.  Cardiovascular: Regular rate and rhythm, normal S1 and S2, and no murmur noted.  Carotids no bruits. No edema. Palpable pulses to radial arteries.   GI: Normal bowel sounds, soft, non-distended, non-tender  Genitourinary:  deferred  Skin: Warm and dry.     Musculoskeletal: Full ROM of neck. There is no redness, warmth, or swelling of the exposed joints. Gross motor strength is normal.    Neurologic: Awake, alert, oriented to name, place and time. Cranial nerves II-XII are grossly intact. Gait is normal.   Neuropsychiatric: Calm, cooperative. Normal affect.     Labs: (personally reviewed)  Component      Latest Ref Rng & Units 8/26/2020   Creatinine      0.66 - 1.25 mg/dL 1.06   GFR Estimate      >60 mL/min/1.73:m2 85   GFR Estimate If Black      >60 mL/min/1.73:m2 >90   Hemoglobin      13.3 - 17.7 g/dL 16.1   Potassium      3.4 - 5.3 mmol/L 4.1       SODIUM  135 - 145 mmol/L  139     POTASSIUM  3.5 - 5.0 mmol/L  4.5     CHLORIDE  98 - 110 mmol/L  106     CO2,TOTAL  21 - 31 mmol/L  20Low       ANION GAP  5 - 18  13     GLUCOSE  65 - 100 mg/dL  " 97     CALCIUM  8.5 - 10.5 mg/dL  9.5     BUN  8 - 25 mg/dL  15     CREATININE  0.72 - 1.25 mg/dL  1.03     BUN/CREAT RATIO            10 - 20  15     GFR if African American  >60 ml/min/1.73m2  >60     GFR if not African American  >60 ml/min/1.73m2  >60      WHITE BLOOD COUNT          4.5 - 11.0 thou/cu mm  7.0     RED BLOOD COUNT            4.30 - 5.90 mil/cu mm  4.92     HEMOGLOBIN                 13.5 - 17.5 g/dL  15.8     HEMATOCRIT                 37.0 - 53.0 %  46.5     MCV                        80 - 100 fL  95     MCH                        26.0 - 34.0 pg  32.1     MCHC                       32.0 - 36.0 g/dL  34.0     RDW                        11.5 - 15.5 %  14.0     PLATELET COUNT             140 - 440 thou/cu mm  301     MPV                        6.5 - 11.0 fL  9.4     NEUTROPHILS                 %  37.4     LYMPHOCYTES                 %  50.8     MONOCYTES                   %  8.5     EOSINOPHILS                 %  3.2     BASOPHILS                   %  0.1     ABSOLUTE NEUTROPHILS       1.7 - 7.0 thou/cu mm  2.6     ABSOLUTE LYMPHOCYTES       0.9 - 2.9 thou/cu mm  3.5High       ABSOLUTE MONOCYTES         <0.9 thou/cu mm  0.6     ABSOLUTE EOSINOPHILS       <0.5 thou/cu mm  0.2     ABSOLUTE BASOPHILS         <0.3 thou/cu mm  0.0        Outside records reviewed from: care everywhere    ASSESSMENT and PLAN  Katlyn Barlow is a 45 year old male scheduled for perirectal abscess on 8/28/20 by Dr. Moreland in treatment of perirectal abscess.  PAC referral for risk assessment and optimization for anesthesia with comorbid conditions of current tobacco use, hemorrhoids:    Pre-operative considerations:  1.  Cardiac:  Functional status- METS >4. No reported cardiac history.  Denies cardiac symptoms. low risk surgery with 0.4% (RCRI #) risk of major adverse cardiac event.   2.  Pulm:  Airway feasible.  YOYN risk: low. Smokes 1-2 cigarettes daily.  Encouraged smoking cessation and educated not to smoke DOS.  3.  GI:   Risk of PONV score = 0.  If > 2, anti-emetic intervention recommended. Perirectal abscess with above procedure planned  4. : patient reports h/o intermittent testicular soreness. Denies any recent soreness. Denies symptoms today. Denies swelling or urinary symptoms associated when he is symptoatic. Recommended he follow up with his PCP to discuss further.     VTE risk: 0.5%    Patient is optimized and is acceptable candidate for the proposed procedure.  No further diagnostic evaluation is needed.     Deb Fox PA-C  Preoperative Assessment Center  [facility]  Clinic and Surgery Center  Phone: [phone]  Fax: [phone]

## 2020-08-26 NOTE — ANESTHESIA PREPROCEDURE EVALUATION
"Anesthesia Pre-Procedure Evaluation    Patient: Herson Barlow   MRN:     [id number] Gender:   male   Age:    45 year old :      1975        Preoperative Diagnosis: Perirectal abscess [K61.1]   Procedure(s):  EXAM UNDER ANESTHESIA, ANUS  PLACEMENT, SETON STITCH  FISTULOTOMY, RECTUM  SIGMOIDOSCOPY, FLEXIBLE     LABS:  CBC:   Lab Results   Component Value Date    WBC 16.0 (H) 2019    WBC 24.5 (H) 2012    HGB 14.0 2019    HGB 14.0 2012    HCT 43.0 2019    HCT 40.2 2012     2019     2012     BMP:   Lab Results   Component Value Date     2019     2012    POTASSIUM 3.9 2019    POTASSIUM 4.4 2012    CHLORIDE 106 2019    CHLORIDE 107 2012    CO2 24 2019    CO2 23 2012    BUN 11 2019    BUN 9 2012    CR 0.92 2019    CR 0.93 2012    GLC 96 2019     (H) 2012     COAGS:   Lab Results   Component Value Date    PTT 36 2012    INR 1.25 (H) 2012     POC: No results found for: BGM, HCG, HCGS  OTHER:   Lab Results   Component Value Date    LACT 0.9 2019    ELOY 8.6 2019    PHOS 6.9 (H) 2012    MAG 2.6 (H) 2012    .4 (H) 2012        Preop Vitals    BP Readings from Last 3 Encounters:   19 (!) 157/94   19 129/84   19 130/80    Pulse Readings from Last 3 Encounters:   19 68   19 70   19 76      Resp Readings from Last 3 Encounters:   19 18   18 14   13 16    SpO2 Readings from Last 3 Encounters:   19 99%   19 97%   19 99%      Temp Readings from Last 1 Encounters:   19 97.9  F (36.6  C) (Oral)    Ht Readings from Last 1 Encounters:   20 1.753 m (5' 9\")      Wt Readings from Last 1 Encounters:   20 101.2 kg (223 lb)    Estimated body mass index is 32.93 kg/m  as calculated from the following:    Height as of 20: 1.753 m (5' " "9\").    Weight as of 8/25/20: 101.2 kg (223 lb).     LDA:  Open Drain Rectal  (Active)   Number of days: 628        Past Medical History:   Diagnosis Date     Acute renal failure (H) 7/12/2012     External hemorrhoids with complication      Tobacco use disorder     Smokes 1 to 2 cigs/day. Started at 26 years      Past Surgical History:   Procedure Laterality Date     EXAM UNDER ANESTHESIA ANUS N/A 12/7/2018    Procedure: EXAM UNDER ANESTHESIA ANUS;  Surgeon: Bird Moreland MD;  Location: [facility]     FISTULOTOMY RECTUM  1/23/2013    Procedure: FISTULOTOMY RECTUM;  Rectal Exam, Fistulotomy, Seton Placement;  Surgeon: Bird Moreland MD;  Location: [facility] OR     HC HEMORRHOIDECTOMY,INT/EXT,COMPLX  9/21/09     HEMORRHOID SURGERY  2009     INCISION AND DRAINAGE PERINEAL, COMBINED  6/29/2012    Procedure: COMBINED INCISION AND DRAINAGE PERINEAL;  Incision and drainage of buttocks wound with wash out  of buttocks wound;  Surgeon: Jessica Olvera MD;  Location: [facility] OR     INCISION AND DRAINAGE RECTUM, COMBINED  12/25/2012    Procedure: COMBINED INCISION AND DRAINAGE RECTUM;  Exam Under Anesthesia, drainage of deep post anal abcess;  Surgeon: Bird Moreland MD;  Location: [facility] OR     IRRIGATION AND DEBRIDEMENT RECTUM, COMBINED  6/27/2012    Procedure: COMBINED IRRIGATION AND DEBRIDEMENT RECTUM;  Irrigation and Debridement Rectal Abscess;  Surgeon: Jessica Olvera MD;  Location: [facility] OR     PLACEMENT OF SETON RECTUM N/A 12/7/2018    Procedure: PLACEMENT OF SETON RECTUM;  Surgeon: Bird Moreland MD;  Location: [facility]      Allergies   Allergen Reactions     Ivp Dye [Contrast Dye]         Anesthesia Evaluation     . Pt has had prior anesthetic.     No history of anesthetic complications          ROS/MED HX    ENT/Pulmonary:     (+)tobacco use (1/2 cigarettes daily), Current use , . .    Neurologic:  - neg neurologic ROS     Cardiovascular:  - neg cardiovascular ROS   (+) ----. : . . . :. . No previous cardiac " testing      (-) taking anticoagulants/antiplatelets   METS/Exercise Tolerance: Comment: Walks 5 flight of stairs for friends apartment >4 METS   Hematologic:  - neg hematologic  ROS      (-) History of Transfusion   Musculoskeletal:  - neg musculoskeletal ROS       GI/Hepatic: Comment: Perirectal abscess    (+) Other GI/Hepatic External hemorrhoids with complication       Renal/Genitourinary: Comment: H/o ARF. No chronic kidney disease    (+) chronic renal disease,       Endo:  - neg endo ROS       Psychiatric:  - neg psychiatric ROS       Infectious Disease:  - neg infectious disease ROS       Malignancy:      - no malignancy   Other:                         PHYSICAL EXAM:   Mental Status/Neuro: A/A/O   Airway: Facies: Feasible  Mallampati: I  Mouth/Opening: Full  TM distance: > 6 cm  Neck ROM: Full   Respiratory: Auscultation: CTAB     Resp. Rate: Normal     Resp. Effort: Normal      CV: Rhythm: Regular  Heart: Normal Sounds  Edema: None   Comments: Few missing posterior teeth     Dental: Details                Assessment:   ASA SCORE: 2    H&P: History and physical reviewed and following examination; no interval change.   Smoking Status:  Non-Smoker/Unknown   NPO Status: NPO Appropriate     Plan:   Anes. Type:  MAC   Pre-Medication: Acetaminophen; Gabapentin   Induction:  N/a   Airway: Native Airway   Access/Monitoring: PIV   Maintenance: N/a     Postop Plan:   Postop Pain: None  Postop Sedation/Airway: Not planned  Disposition: Outpatient     PONV Management:   Adult Risk Factors:, Non-Smoker   Prevention: Ondansetron     CONSENT: Direct conversation   Plan and risks discussed with: Patient   Blood Products: N/a                PAC Discussion and Assessment    ASA Classification: 2  Case is suitable for: ASC  Anesthetic techniques and relevant risks discussed: MAC with GA as backup  Invasive monitoring and risk discussed:   Types:   Possibility and Risk of blood transfusion discussed:   NPO instructions given:    Additional anesthetic preparation and risks discussed:   Needs early admission to pre-op area:   Other:     PAC Resident/NP Anesthesia Assessment:  Katlyn Barlow is a 45 year old male scheduled for perirectal abscess on 8/28/20 by Dr. Moreland in treatment of perirectal abscess.  PAC referral for risk assessment and optimization for anesthesia with comorbid conditions of current tobacco use, hemorrhoids:    Pre-operative considerations:  1.  Cardiac:  Functional status- METS >4. No reported cardiac history.  Denies cardiac symptoms. low risk surgery with 0.4% (RCRI #) risk of major adverse cardiac event.   2.  Pulm:  Airway feasible.  YONY risk: low. Smokes 1-2 cigarettes daily.  Encouraged smoking cessation and educated not to smoke DOS.  3.  GI:  Risk of PONV score = 0.  If > 2, anti-emetic intervention recommended. Perirectal abscess with above procedure planned  4. : patient reports h/o intermittent testicular soreness. Denies any recent soreness. Denies symptoms today. Denies swelling or urinary symptoms associated when he is symptoatic. Recommended he follow up with his PCP to discuss further.     VTE risk: 0.5%    Patient is optimized and is acceptable candidate for the proposed procedure.  No further diagnostic evaluation is needed.     **For further details of assessment, testing, and physical exam please see H and P completed on same date.      Deb Fox PA-C        Mid-Level Provider/Resident:   Date:   Time:     Attending Anesthesiologist Anesthesia Assessment:        Anesthesiologist:   Date:   Time:   Pass/Fail:   Disposition:     PAC Pharmacist Assessment:        Pharmacist:   Date:   Time:    Deb Fox PA-C

## 2020-08-27 LAB
SARS-COV-2 RNA SPEC QL NAA+PROBE: NOT DETECTED
SPECIMEN SOURCE: NORMAL

## 2020-08-28 ENCOUNTER — ANESTHESIA (OUTPATIENT)
Dept: SURGERY | Facility: AMBULATORY SURGERY CENTER | Age: 45
End: 2020-08-28

## 2020-08-28 ENCOUNTER — HOSPITAL ENCOUNTER (OUTPATIENT)
Facility: AMBULATORY SURGERY CENTER | Age: 45
End: 2020-08-28
Attending: COLON & RECTAL SURGERY
Payer: COMMERCIAL

## 2020-08-28 VITALS
HEART RATE: 64 BPM | SYSTOLIC BLOOD PRESSURE: 112 MMHG | RESPIRATION RATE: 16 BRPM | BODY MASS INDEX: 33.33 KG/M2 | OXYGEN SATURATION: 99 % | WEIGHT: 225 LBS | DIASTOLIC BLOOD PRESSURE: 69 MMHG | TEMPERATURE: 97.8 F | HEIGHT: 69 IN

## 2020-08-28 DIAGNOSIS — K61.1 PERIRECTAL ABSCESS: ICD-10-CM

## 2020-08-28 DIAGNOSIS — K60.30 FISTULA, ANAL: Primary | ICD-10-CM

## 2020-08-28 RX ORDER — ACETAMINOPHEN 325 MG/1
975 TABLET ORAL ONCE
Status: COMPLETED | OUTPATIENT
Start: 2020-08-28 | End: 2020-08-28

## 2020-08-28 RX ORDER — ONDANSETRON 2 MG/ML
4 INJECTION INTRAMUSCULAR; INTRAVENOUS EVERY 30 MIN PRN
Status: DISCONTINUED | OUTPATIENT
Start: 2020-08-28 | End: 2020-08-29 | Stop reason: HOSPADM

## 2020-08-28 RX ORDER — GABAPENTIN 300 MG/1
300 CAPSULE ORAL ONCE
Status: COMPLETED | OUTPATIENT
Start: 2020-08-28 | End: 2020-08-28

## 2020-08-28 RX ORDER — KETAMINE HYDROCHLORIDE 10 MG/ML
INJECTION INTRAMUSCULAR; INTRAVENOUS PRN
Status: DISCONTINUED | OUTPATIENT
Start: 2020-08-28 | End: 2020-08-28

## 2020-08-28 RX ORDER — MEPERIDINE HYDROCHLORIDE 25 MG/ML
12.5 INJECTION INTRAMUSCULAR; INTRAVENOUS; SUBCUTANEOUS
Status: DISCONTINUED | OUTPATIENT
Start: 2020-08-28 | End: 2020-08-29 | Stop reason: HOSPADM

## 2020-08-28 RX ORDER — SODIUM CHLORIDE, SODIUM LACTATE, POTASSIUM CHLORIDE, CALCIUM CHLORIDE 600; 310; 30; 20 MG/100ML; MG/100ML; MG/100ML; MG/100ML
INJECTION, SOLUTION INTRAVENOUS CONTINUOUS
Status: DISCONTINUED | OUTPATIENT
Start: 2020-08-28 | End: 2020-08-29 | Stop reason: HOSPADM

## 2020-08-28 RX ORDER — ONDANSETRON 4 MG/1
4 TABLET, ORALLY DISINTEGRATING ORAL EVERY 30 MIN PRN
Status: DISCONTINUED | OUTPATIENT
Start: 2020-08-28 | End: 2020-08-29 | Stop reason: HOSPADM

## 2020-08-28 RX ORDER — TETRACAINE HYDROCHLORIDE 5 MG/ML
1-2 SOLUTION OPHTHALMIC ONCE
Status: DISCONTINUED | OUTPATIENT
Start: 2020-08-28 | End: 2020-08-29 | Stop reason: HOSPADM

## 2020-08-28 RX ORDER — GLYCOPYRROLATE 0.2 MG/ML
INJECTION, SOLUTION INTRAMUSCULAR; INTRAVENOUS PRN
Status: DISCONTINUED | OUTPATIENT
Start: 2020-08-28 | End: 2020-08-28

## 2020-08-28 RX ORDER — NALOXONE HYDROCHLORIDE 0.4 MG/ML
.1-.4 INJECTION, SOLUTION INTRAMUSCULAR; INTRAVENOUS; SUBCUTANEOUS
Status: DISCONTINUED | OUTPATIENT
Start: 2020-08-28 | End: 2020-08-29 | Stop reason: HOSPADM

## 2020-08-28 RX ORDER — BUPIVACAINE HYDROCHLORIDE 2.5 MG/ML
INJECTION, SOLUTION INFILTRATION; PERINEURAL PRN
Status: DISCONTINUED | OUTPATIENT
Start: 2020-08-28 | End: 2020-08-28 | Stop reason: HOSPADM

## 2020-08-28 RX ORDER — SODIUM CHLORIDE, SODIUM LACTATE, POTASSIUM CHLORIDE, CALCIUM CHLORIDE 600; 310; 30; 20 MG/100ML; MG/100ML; MG/100ML; MG/100ML
INJECTION, SOLUTION INTRAVENOUS CONTINUOUS PRN
Status: DISCONTINUED | OUTPATIENT
Start: 2020-08-28 | End: 2020-08-28

## 2020-08-28 RX ORDER — DEXAMETHASONE SODIUM PHOSPHATE 4 MG/ML
INJECTION, SOLUTION INTRA-ARTICULAR; INTRALESIONAL; INTRAMUSCULAR; INTRAVENOUS; SOFT TISSUE PRN
Status: DISCONTINUED | OUTPATIENT
Start: 2020-08-28 | End: 2020-08-28

## 2020-08-28 RX ORDER — ACETAMINOPHEN 325 MG/1
975 TABLET ORAL ONCE
Status: DISCONTINUED | OUTPATIENT
Start: 2020-08-28 | End: 2020-08-28 | Stop reason: HOSPADM

## 2020-08-28 RX ORDER — ONDANSETRON 2 MG/ML
INJECTION INTRAMUSCULAR; INTRAVENOUS PRN
Status: DISCONTINUED | OUTPATIENT
Start: 2020-08-28 | End: 2020-08-28

## 2020-08-28 RX ORDER — ONDANSETRON 4 MG/1
4 TABLET, ORALLY DISINTEGRATING ORAL
Status: DISCONTINUED | OUTPATIENT
Start: 2020-08-28 | End: 2020-08-29 | Stop reason: HOSPADM

## 2020-08-28 RX ORDER — BUPIVACAINE HYDROCHLORIDE AND EPINEPHRINE 2.5; 5 MG/ML; UG/ML
INJECTION, SOLUTION INFILTRATION; PERINEURAL PRN
Status: DISCONTINUED | OUTPATIENT
Start: 2020-08-28 | End: 2020-08-28 | Stop reason: HOSPADM

## 2020-08-28 RX ORDER — KETOROLAC TROMETHAMINE 30 MG/ML
INJECTION, SOLUTION INTRAMUSCULAR; INTRAVENOUS PRN
Status: DISCONTINUED | OUTPATIENT
Start: 2020-08-28 | End: 2020-08-28

## 2020-08-28 RX ORDER — NAPROXEN 500 MG/1
500 TABLET ORAL 2 TIMES DAILY WITH MEALS
Qty: 60 TABLET | Refills: 0 | Status: SHIPPED | OUTPATIENT
Start: 2020-08-28

## 2020-08-28 RX ORDER — LIDOCAINE 40 MG/G
CREAM TOPICAL
Status: DISCONTINUED | OUTPATIENT
Start: 2020-08-28 | End: 2020-08-28 | Stop reason: HOSPADM

## 2020-08-28 RX ORDER — PROPOFOL 10 MG/ML
INJECTION, EMULSION INTRAVENOUS CONTINUOUS PRN
Status: DISCONTINUED | OUTPATIENT
Start: 2020-08-28 | End: 2020-08-28

## 2020-08-28 RX ORDER — LIDOCAINE HYDROCHLORIDE 20 MG/ML
INJECTION, SOLUTION INFILTRATION; PERINEURAL PRN
Status: DISCONTINUED | OUTPATIENT
Start: 2020-08-28 | End: 2020-08-28

## 2020-08-28 RX ADMIN — PROPOFOL 125 MCG/KG/MIN: 10 INJECTION, EMULSION INTRAVENOUS at 11:05

## 2020-08-28 RX ADMIN — SODIUM CHLORIDE, SODIUM LACTATE, POTASSIUM CHLORIDE, CALCIUM CHLORIDE: 600; 310; 30; 20 INJECTION, SOLUTION INTRAVENOUS at 11:02

## 2020-08-28 RX ADMIN — KETAMINE HYDROCHLORIDE 20 MG: 10 INJECTION INTRAMUSCULAR; INTRAVENOUS at 11:05

## 2020-08-28 RX ADMIN — DEXAMETHASONE SODIUM PHOSPHATE 4 MG: 4 INJECTION, SOLUTION INTRA-ARTICULAR; INTRALESIONAL; INTRAMUSCULAR; INTRAVENOUS; SOFT TISSUE at 11:05

## 2020-08-28 RX ADMIN — KETOROLAC TROMETHAMINE 30 MG: 30 INJECTION, SOLUTION INTRAMUSCULAR; INTRAVENOUS at 11:55

## 2020-08-28 RX ADMIN — GLYCOPYRROLATE 0.2 MG: 0.2 INJECTION, SOLUTION INTRAMUSCULAR; INTRAVENOUS at 11:05

## 2020-08-28 RX ADMIN — GABAPENTIN 300 MG: 300 CAPSULE ORAL at 08:59

## 2020-08-28 RX ADMIN — ACETAMINOPHEN 975 MG: 325 TABLET ORAL at 08:57

## 2020-08-28 RX ADMIN — ONDANSETRON 4 MG: 2 INJECTION INTRAMUSCULAR; INTRAVENOUS at 11:05

## 2020-08-28 RX ADMIN — LIDOCAINE HYDROCHLORIDE 100 MG: 20 INJECTION, SOLUTION INFILTRATION; PERINEURAL at 11:05

## 2020-08-28 ASSESSMENT — MIFFLIN-ST. JEOR
SCORE: 1901.22
SCORE: 1900.97

## 2020-08-28 NOTE — ANESTHESIA POSTPROCEDURE EVALUATION
Anesthesia POST Procedure Evaluation    Patient: Herson Barlow   MRN:     [id number] Gender:   male   Age:    45 year old :      1975        Preoperative Diagnosis: Perirectal abscess [K61.1]   Procedure(s):  EXAM UNDER ANESTHESIA, ANUS  PLACEMENT, SETON STITCH  FISTULOTOMY, RECTUM  SIGMOIDOSCOPY, FLEXIBLE   Postop Comments: No value filed.     Anesthesia Type: MAC       Disposition: Outpatient   Postop Pain Control: Uneventful            Sign Out: Well controlled pain   PONV: No   Neuro/Psych: Uneventful            Sign Out: Acceptable/Baseline neuro status   Airway/Respiratory: Uneventful            Sign Out: Acceptable/Baseline resp. status   CV/Hemodynamics: Uneventful            Sign Out: Acceptable CV status   Other NRE:    DID A NON-ROUTINE EVENT OCCUR? No    Event details/Postop Comments:  Scratchy dry eye in PACU. Patient stating it is painful and feels dry.Possible corneal abrasion.   Ophthalmology saw the patient (negative for corneal abrasion) and is giving lubricating eye drops    AAB         Last Anesthesia Record Vitals:  CRNA VITALS  2020 1126 - 2020 1226      2020             Pulse:  110    SpO2:  100 %          Last PACU Vitals:  Vitals Value Taken Time   BP     Temp     Pulse     Resp     SpO2     Temp src     NIBP 121/89 2020 11:51 AM   Pulse 110 2020 11:56 AM   SpO2 100 % 2020 11:56 AM   Resp     Temp     Ht Rate 115 2020 11:54 AM   Temp 2           Electronically Signed By: Loc Osman DO, 2020, 12:49 PM

## 2020-08-28 NOTE — BRIEF OP NOTE
[facility]    Brief Operative Note    Pre-operative diagnosis: Perirectal abscess [K61.1]  Post-operative diagnosis perianal fistula    Procedure: Procedure(s):  EXAM UNDER ANESTHESIA, ANUS  PLACEMENT, SETON STITCH  FISTULOTOMY, RECTUM  SIGMOIDOSCOPY, FLEXIBLE  Surgeon: Surgeon(s) and Role:     * Bird Moreland MD - Primary  Anesthesia: Monitor Anesthesia Care   Estimated blood loss: None  Drains: None  Specimens:   ID Type Source Tests Collected by Time Destination   A : anal fistua Tissue Other SURGICAL PATHOLOGY EXAM Bird Moreland MD 8/28/2020 11:33 AM      Findings:   left posterior fistula with blind extension to the left anterior position. Fistulotomy completed and replacement of seton. .  Complications: None.  Implants: * No implants in log *     Stacy Wiley MD  PGY-3 General Surgery

## 2020-08-28 NOTE — ANESTHESIA CARE TRANSFER NOTE
Patient: Herson Barlow    Procedure(s):  EXAM UNDER ANESTHESIA, ANUS  PLACEMENT, SETON STITCH  FISTULOTOMY, RECTUM  SIGMOIDOSCOPY, FLEXIBLE    Diagnosis: Perirectal abscess [K61.1]  Diagnosis Additional Information: No value filed.    Anesthesia Type:   MAC     Note:    Patient transferred to:Phase II  Handoff Report: Identifed the Patient, Identified the Reponsible Provider, Reviewed the pertinent medical history, Discussed the surgical course, Reviewed Intra-OP anesthesia mangement and issues during anesthesia, Set expectations for post-procedure period and Allowed opportunity for questions and acknowledgement of understanding      Vitals: (Last set prior to Anesthesia Care Transfer)    CRNA VITALS  8/28/2020 1126 - 8/28/2020 1156      8/28/2020             Pulse:  110    SpO2:  100 %                Electronically Signed By: MARIANGEL Pinon CRNA  August 28, 2020  11:56 AM

## 2020-08-28 NOTE — DISCHARGE INSTRUCTIONS
Anorectal Surgery Instructions    What can I expect after anorectal surgery?  Most anorectal procedures are done as outpatient surgery, and you go home the same day as the procedure. A few surgical procedures will require that you stay in the hospital for about one to three days. No matter where the procedure is done or how long or short it takes, these recommendations will help you heal and feel more comfortable.    Medicines:  The anal area is very sensitive; you can expect to have some pain for up to 2-4 weeks after the procedure. Your doctor will give you a prescription for one or more pain medications.    Take naprosyn 500 mg twice a day OR ibuprofen 600 mg four times a day     Take this on a regular basis (not as needed) following your surgery.     The drugs are best taken with food.  Do not take if it causes stomach upset or if you have a history of ulcers or gastritis. You can stop the naprosyn (or ibuprofen) or reduce the dose when you are feeling better.    DO NOT use naprosyn, ibuprofen, or other similar agents (eg. Advil or Aleve) if you have inflammatory bowel disease (Ulcerative Colitis or Crohn's disease) or if your doctor as advised you against using these medications    Take acetominaphen (Tylenol) 650-1000 mg four times a day.     Take this on a regular basis (not as needed) following surgery for pain control.     Take the lower dose if you are >65 years old or have liver disease. The maximum dose of acetominaphen is 4000 mg a day. You can stop the acetaminophen or reduce the dose when you are feeling better.    It is important to realize that many narcotic pain relievers (including vicodin, percocet, tylenol #3) also have acetaminophen, and excessive doses of acetaminophen can be dangerous, so do not take these in addition to acetominaphen.  You may take narcotics that don't contain acetominaphen such as oxycodone.      Take oxycodone AS NEEDED in addition to the acetominaphen and naprosyn.       Because narcotics have side effects (including constipation), you should reduce your use of these medications as tolerated as your pain improves.    *In general, the best strategy is to take (if you are able to tolerate it) the tylenol and naprosen on a regular basis until your pain has largely gone away. You can take the narcotic pain medicine as needed in addition to the tylenol and ibuprofen. As your pain begins to lessen, you should cut back on your narcotic use while continuing to take your regular tylenol and naprosyn doses.      Refilling prescriptions. If you need additional pain medication, please call the triage nurse at [phone] during normal business hours (8 a.m. to 4 p.m., Monday though Friday) or have your pharmacy fax a refill request to [phone]. If you call after hours or on the weekends, the doctor on call may not know you personally and may not renew narcotic pain medication by phone. Call your primary care provider for all other medication refills.    Perineal care:  External gauze dressing can be removed the morning after surgery. If you have an adhesive dressing stuck to the incision, DO NOT remove this.   Tub baths:    If possible, take a tub bath immediately after each bowel movement.     Baths should be take at least 3 times daily for the first week to 10 days following your procedure. You should soak in the tub for 10 to 15 minutes each time with water as warm as you can tolerate.     Even after you go back to work, it is a good idea to sit in the tub in the morning, after returning from work, and again in the evening before bedtime.    Bleeding/Infection:    You can expect to have some bleeding after bowel movements, but it should stop soon after you wipe.     Use a wet cloth or perianal pad (Tucks or Preparation H pads) to gently wipe the area after each bowel movement.    Do not rub the anal area or use a lot of pressure.    Using a spray bottle filled with warm water helps  loosen any remaining stool. Blot gently with a soft dry cloth or tissue paper.    Infection around the anal opening is not very common. The anal area has excellent blood supply, which helps the area to heal. Bloody discharge after bowel movements is normal and may last 2 to 4 weeks after your surgery. However, if you bleed between bowel movements and cannot get it to stop, call the triage nurse immediately [phone].    Bowel function:  Take a fiber supplement such as Metamucil, which is over the counter. It is important to drink six to eight glasses of water or juice everyday when using fiber products.    If you do not have a bowel movement after 1-2 days:    Take Milk of Magnesia-2 tablespoons.       If there are no results, repeat this or add over the counter Miralax.      If you still do not have results, contact the clinic.     If there are no results, repeat this. Stop taking Milk of Magnesia or other laxatives if you begin to have diarrhea.    * Constipation will cause you to strain when you have a bowel movement. The hard stool will be difficult to pass, will increase pain and bleeding, and will slow down healing.  Try to avoid constipation and/or diarrhea as this can make the pain and bleeding worse.    * It is important to have regular bowel movements at least every other day and to keep your stool soft.  A high fiber diet, including at least four servings of fruits or vegetables daily, will help to keep your bowel movements regular and soft.    Activity:  After your procedure, there are no restrictions on your activity     except restrictions surrounding being on narcotics and in pain, such as no heavy machine operating or driving.     You may walk, climb stairs, ride in a car, and sit as tolerated.     It is helpful to avoid sitting in one position for long periods (2 or more hours).    After some surgeries, you may be told not to perform any lifting (more than 10 pounds) for several weeks after  surgery.    When to call:  When do I need to call the doctor or triage nurse?    If you experience any of the problems listed here, call our triage nurse during business hours ([phone]).     The nurse will help you with your problem or have the doctor call you.     After hours and on weekends, please call the main hospital number ([phone]) and ask for the colon and rectal surgery person on call.     Some is available to help you 24 hours a day, seven days a week.    Call for:   ? Fever greater than 101 degrees   ? Chills   ? Foul-smelling drainage   ? Nausea and vomiting   ? Diarrhea - greater than 3 water stools in 24 hours   ? Constipation - no bowel movement after 3 days   ? Severe bleeding that does not stop soon after a bowel movement   ? Problems with the incision, including increased pain, swelling, or redness  [facility]  Home Care Following Anesthesia  For 24 hours after surgery:  1. Get plenty of rest.  A responsible adult must stay with you for at least 24 hours after you leave the surgery center.  2. Do not drive or use heavy equipment.  If you have weakness or tingling, don't drive or use heavy equipment until this feeling goes away.   3. Do not drink alcohol.   4. Avoid strenuous or risky activities.  Ask for help when climbing stairs.  5. You may feel lightheaded.  IF so, sit for a few minutes before standing.  Have someone help you get up.   6. If you have nausea (feel sick to your stomach): Drink only clear liquids such as apple juice, ginger ale, broth or 7-Up.  Rest may also help.  Be sure to drink enough fluids.  Move to a regular diet as you feel able.   7. You may have a slight fever.  Call the doctor if your fever is over 100 F (37.7 C) (taken under the tongue) or lasts longer than 24 hours.  8. You may have a dry mouth, a sore throat, muscle aches or trouble sleeping. These should go away after 24 hours.  9. Do not make important or legal  decisions.               Tips for taking pain medications  To get the best pain relief possible, remember these points:    Take pain medications as directed, before pain becomes severe.    Pain medication can upset your stomach: taking it with food may help.    Constipation is a common side effect of pain medication. Drink plenty of  fluids.    Eat foods high in fiber. Take a stool softener if recommended by your doctor or pharmacist.    Do not drink alcohol, drive or operate machinery while taking pain medications.    Ask about other ways to control pain, such as with heat, ice or relaxation.    Tylenol/Acetaminophen Consumption  To help encourage the safe use of acetaminophen, the makers of TYLENOL  have lowered the maximum daily dose for single-ingredient Extra Strength TYLENOL  (acetaminophen) products sold in the [place] from 8 pills per day (4,000 mg) to 6 pills per day (3,000 mg). The dosing interval has also changed from 2 pills every 4-6 hours to 2 pills every 6 hours.    If you feel your pain relief is insufficient, you may take Tylenol/Acetaminophen in addition to your narcotic pain medication.     Be careful not to exceed 3,000 mg of Tylenol/Acetaminophen in a 24 hour period from all sources.    If you are taking extra strength Tylenol/acetaminophen (500 mg), the maximum dose is 6 tablets in 24 hours.    If you are taking regular strength acetaminophen (325 mg), the maximum dose is 9 tablets in 24 hours.    Call a doctor for any of the followin. Signs of infection (fever, growing tenderness at the surgery site, a large amount of drainage or bleeding, severe pain, foul-smelling drainage, redness, swelling).  2. It has been over 8 to 10 hours since surgery and you are still not able to urinate (pass water).  3. Headache for over 24 hours.  4. Numbness, tingling or weakness the day after surgery (if you had spinal anesthesia).  5. Signs of Covid-19 infection (temperature over 100 degrees, shortness of  breath, cough, loss of taste/smell, generalized body aches, persistent headache, chills, sore throat, nausea/vomiting/diarrhea)  Your doctor is:       Dr. Bird Moreland, Colon Rectal: [phone]               Or dial [phone] and ask for the resident on call for:  Colon Rectal  For emergency care, call the:  [facility]:  [phone] (TTY for hearing impaired: [phone])

## 2020-09-01 LAB — COPATH REPORT: NORMAL

## 2020-09-18 ENCOUNTER — OFFICE VISIT (OUTPATIENT)
Dept: SURGERY | Facility: CLINIC | Age: 45
End: 2020-09-18
Payer: COMMERCIAL

## 2020-09-18 VITALS
OXYGEN SATURATION: 100 % | TEMPERATURE: 98.8 F | HEART RATE: 67 BPM | SYSTOLIC BLOOD PRESSURE: 148 MMHG | WEIGHT: 226.9 LBS | BODY MASS INDEX: 33.61 KG/M2 | HEIGHT: 69 IN | DIASTOLIC BLOOD PRESSURE: 94 MMHG

## 2020-09-18 DIAGNOSIS — K60.30 ANAL FISTULA: ICD-10-CM

## 2020-09-18 DIAGNOSIS — Z09 FOLLOW-UP EXAMINATION FOLLOWING SURGERY: Primary | ICD-10-CM

## 2020-09-18 ASSESSMENT — MIFFLIN-ST. JEOR: SCORE: 1909.59

## 2020-09-18 ASSESSMENT — PAIN SCALES - GENERAL: PAINLEVEL: NO PAIN (0)

## 2020-09-18 NOTE — NURSING NOTE
"  Chief Complaint   Patient presents with     Surgical Followup     Date of surgery 8/28/2020       Vitals:    09/18/20 0942   BP: (!) 148/94   BP Location: Left arm   Patient Position: Sitting   Cuff Size: Adult Large   Pulse: 67   Temp: 98.8  F (37.1  C)   TempSrc: Oral   SpO2: 100%   Weight: 226 lb 14.4 oz   Height: 5' 9\"       Body mass index is 33.51 kg/m .      Veronica Monterroso, EMT                      "

## 2020-09-18 NOTE — LETTER
"2020       RE: Herson Barlow  [address]  [facility] [address]     Dear Colleague,    Thank you for referring your patient, Herson Barlow, to the [facility] COLON AND RECTAL SURGERY at [facility]. Please see a copy of my visit note below.    Colon and Rectal Surgery Postoperative Clinic Note    RE: Herson Barlow  : 1975  MELISSA: 2020    Herson Barlow is a very pleasant 45 year old male with anal fistula now status post examination under anesthesia with fistulotomy and marsupialization, seton change, and flexible sigmoidoscopy on 20 with Dr. Moreland     Interval history: Herson has been doing well since his procedure.  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  No fevers or chills.  He states that he overall feels much better than he did before surgery he is having normal bowel movements and denies any leakage of stool or flatus.    Physical Examination: Exam was chaperoned by Veronica Monterroso, EMT  BP (!) 148/94 (BP Location: Left arm, Patient Position: Sitting, Cuff Size: Adult Large)   Pulse 67   Temp 98.8  F (37.1  C) (Oral)   Ht 5' 9\"   Wt 226 lb 14.4 oz   SpO2 100%   BMI 33.51 kg/m            General: Alert, oriented, in no acute distress, sitting comfortably  HEENT: Mucous membranes moist  Perianal external examination:  Yellow vessel loop seton present in the posterior position with fistulotomy site almost completely filled in.  Anterior to this there is an external opening along the prior scar line that probes approximately 4 cm towards the anal opening.  No purulent drainage but some bloody drainage present.    Digital rectal examination: Was deferred.    Anoscopy: Was deferred.    Assessment/Plan:  45 year old male status post examination under anesthesia with fistulotomy and marsupialization, seton " change, and flexible sigmoidoscopy on 8/28/20 with Dr. Moreland.  He has an additional external opening anterior to his seton a fistulotomy site.  This does not seem to connect to his current fistula tract but can be probed toward the anal opening.  No significant pain and no purulent drainage on exam today.  Will discuss with Dr. Moreland but I think he likely needs a repeat examination under anesthesia or repeat MRI to better characterize this. Patient's questions were answered to his stated satisfaction and he is in agreement with this plan.    Medical history:  Past Medical History:   Diagnosis Date     Acute renal failure (H) 7/12/2012     External hemorrhoids with complication      Tobacco use disorder     Smokes 1 to 2 cigs/day. Started at 26 years       Surgical history:  Past Surgical History:   Procedure Laterality Date     EXAM UNDER ANESTHESIA ANUS N/A 12/7/2018    Procedure: EXAM UNDER ANESTHESIA ANUS;  Surgeon: Bird Moreland MD;  [facility]     EXAM UNDER ANESTHESIA ANUS N/A 8/28/2020    Procedure: EXAM UNDER ANESTHESIA, ANUS;  Surgeon: Bird Moreland MD;  [facility]     FISTULOTOMY RECTUM  1/23/2013    Procedure: FISTULOTOMY RECTUM;  Rectal Exam, Fistulotomy, Seton Placement;  Surgeon: Bird Moreland MD;  [facility]     FISTULOTOMY RECTUM N/A 8/28/2020    Procedure: FISTULOTOMY, RECTUM;  Surgeon: Bird Moreland MD;  [facility]     HC HEMORRHOIDECTOMY,INT/EXT,COMPLX  9/21/09     HEMORRHOID SURGERY  2009     INCISION AND DRAINAGE PERINEAL, COMBINED  6/29/2012    Procedure: COMBINED INCISION AND DRAINAGE PERINEAL;  Incision and drainage of buttocks wound with wash out  of buttocks wound;  Surgeon: Jessica Olvera MD;  [facility]     INCISION AND DRAINAGE RECTUM, COMBINED  12/25/2012    Procedure: COMBINED INCISION AND DRAINAGE RECTUM;  Exam Under Anesthesia, drainage of deep post anal abcess;  Surgeon: Bird Moreland MD;  [facility]     IRRIGATION AND  DEBRIDEMENT RECTUM, COMBINED  6/27/2012    Procedure: COMBINED IRRIGATION AND DEBRIDEMENT RECTUM;  Irrigation and Debridement Rectal Abscess;  Surgeon: Jessica Olvera MD;  [facility]     PLACEMENT OF SETON RECTUM N/A 12/7/2018    Procedure: PLACEMENT OF SETON RECTUM;  Surgeon: Bird Moreland MD;  [facility]     PLACEMENT OF SETON RECTUM N/A 8/28/2020    Procedure: PLACEMENT, SETON STITCH;  Surgeon: Bird Moreland MD;  [facility]     SIGMOIDOSCOPY FLEXIBLE N/A 8/28/2020    Procedure: SIGMOIDOSCOPY, FLEXIBLE;  Surgeon: Bird Moreland MD;  [facility]       Problem list:  Patient Active Problem List    Diagnosis Date Noted     Acute renal failure (H) 07/12/2012     Priority: Medium     Perirectal abscess 06/27/2012     Priority: Medium     CARDIOVASCULAR SCREENING; LDL GOAL LESS THAN 160 05/09/2010     Priority: Medium     External hemorrhoids with complication      Priority: Medium     Tobacco use disorder      Priority: Medium     Smokes 6 to 7 cigs/day. Started at 26 years         Medications:  Current Outpatient Medications   Medication Sig Dispense Refill     naproxen (NAPROSYN) 500 MG tablet Take 1 tablet (500 mg) by mouth 2 times daily (with meals) Until you no longer need it for pain (Patient not taking: Reported on 9/18/2020) 60 tablet 0     NO ACTIVE MEDICATIONS          Allergies:  Allergies   Allergen Reactions     Ivp Dye [Contrast Dye]        Family history:  Family History   Problem Relation Age of Onset     Diabetes Brother      Diabetes Brother      Anesthesia Reaction No family hx of      Deep Vein Thrombosis (DVT) No family hx of        Social history:  Social History     Tobacco Use     Smoking status: Light Tobacco Smoker     Types: Cigarettes     Smokeless tobacco: Never Used     Tobacco comment: 1 to 2  cigarettes/day. 2nd hand smoke exposure from roommates.   Substance Use Topics     Alcohol use: No     Marital status: single.  Occupation: Truck  "    Nursing Notes:   Veronica Velazquez, EMT  9/18/2020  9:47 AM  Signed    Chief Complaint   Patient presents with     Surgical Followup     Date of surgery 8/28/2020       Vitals:    09/18/20 0942   BP: (!) 148/94   BP Location: Left arm   Patient Position: Sitting   Cuff Size: Adult Large   Pulse: 67   Temp: 98.8  F (37.1  C)   TempSrc: Oral   SpO2: 100%   Weight: 226 lb 14.4 oz   Height: 5' 9\"       Body mass index is 33.51 kg/m .      DENIZ Hi                       Total face to face time was 15 minutes, >50% counseling.   This is a postop visit    MARIANGEL Bess, NP-C  Colon and Rectal Surgery  [facility]    This note was created using speech recognition software and may contain unintended word substitutions.        Again, thank you for allowing me to participate in the care of your patient.      Sincerely,    MARIANGEL Bess CNP      "

## 2020-09-18 NOTE — PROGRESS NOTES
"Colon and Rectal Surgery Postoperative Clinic Note    RE: Herson Barlow  : 1975  MELISSA: 2020    Herson Barlow is a very pleasant 45 year old male with anal fistula now status post examination under anesthesia with fistulotomy and marsupialization, seton change, and flexible sigmoidoscopy on 20 with Dr. Moreland     Interval history: Herson has been doing well since his procedure.  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  No fevers or chills.  He states that he overall feels much better than he did before surgery he is having normal bowel movements and denies any leakage of stool or flatus.    Physical Examination: Exam was chaperoned by Veronica Monterroso, EMT  BP (!) 148/94 (BP Location: Left arm, Patient Position: Sitting, Cuff Size: Adult Large)   Pulse 67   Temp 98.8  F (37.1  C) (Oral)   Ht 5' 9\"   Wt 226 lb 14.4 oz   SpO2 100%   BMI 33.51 kg/m            General: Alert, oriented, in no acute distress, sitting comfortably  HEENT: Mucous membranes moist  Perianal external examination:  Yellow vessel loop seton present in the posterior position with fistulotomy site almost completely filled in.  Anterior to this there is an external opening along the prior scar line that probes approximately 4 cm towards the anal opening.  No purulent drainage but some bloody drainage present.    Digital rectal examination: Was deferred.    Anoscopy: Was deferred.    Assessment/Plan:  45 year old male status post examination under anesthesia with fistulotomy and marsupialization, seton change, and flexible sigmoidoscopy on 20 with Dr. Moreland.  He has an additional external opening anterior to his seton a fistulotomy site.  This does not seem to connect to his current fistula tract but can be probed toward the anal opening.  No significant pain and no purulent drainage on " exam today.  Will discuss with Dr. Moreland but I think he likely needs a repeat examination under anesthesia or repeat MRI to better characterize this. Patient's questions were answered to his stated satisfaction and he is in agreement with this plan.    Medical history:  Past Medical History:   Diagnosis Date     Acute renal failure (H) 7/12/2012     External hemorrhoids with complication      Tobacco use disorder     Smokes 1 to 2 cigs/day. Started at 26 years       Surgical history:  Past Surgical History:   Procedure Laterality Date     EXAM UNDER ANESTHESIA ANUS N/A 12/7/2018    Procedure: EXAM UNDER ANESTHESIA ANUS;  Surgeon: Bird Moreland MD;  [facility]     EXAM UNDER ANESTHESIA ANUS N/A 8/28/2020    Procedure: EXAM UNDER ANESTHESIA, ANUS;  Surgeon: Bird Moreland MD;  [facility]     FISTULOTOMY RECTUM  1/23/2013    Procedure: FISTULOTOMY RECTUM;  Rectal Exam, Fistulotomy, Seton Placement;  Surgeon: Bird Moreland MD;  [facility]     FISTULOTOMY RECTUM N/A 8/28/2020    Procedure: FISTULOTOMY, RECTUM;  Surgeon: Bird Moreland MD;  [facility]     HC HEMORRHOIDECTOMY,INT/EXT,COMPLX  9/21/09     HEMORRHOID SURGERY  2009     INCISION AND DRAINAGE PERINEAL, COMBINED  6/29/2012    Procedure: COMBINED INCISION AND DRAINAGE PERINEAL;  Incision and drainage of buttocks wound with wash out  of buttocks wound;  Surgeon: Jessica Olvera MD;  [facility]     INCISION AND DRAINAGE RECTUM, COMBINED  12/25/2012    Procedure: COMBINED INCISION AND DRAINAGE RECTUM;  Exam Under Anesthesia, drainage of deep post anal abcess;  Surgeon: Bird Moreland MD;  [facility]     IRRIGATION AND DEBRIDEMENT RECTUM, COMBINED  6/27/2012    Procedure: COMBINED IRRIGATION AND DEBRIDEMENT RECTUM;  Irrigation and Debridement Rectal Abscess;  Surgeon: Jessica Olvera MD;  [facility]     PLACEMENT OF SETON RECTUM N/A 12/7/2018    Procedure: PLACEMENT OF SETON RECTUM;  Surgeon: Aniceto  MD Bird;  [facility]     PLACEMENT OF SETON RECTUM N/A 8/28/2020    Procedure: PLACEMENT, SETON STITCH;  Surgeon: Bird Moreland MD;  [facility]     SIGMOIDOSCOPY FLEXIBLE N/A 8/28/2020    Procedure: SIGMOIDOSCOPY, FLEXIBLE;  Surgeon: Bird Moreland MD;  [facility]       Problem list:  Patient Active Problem List    Diagnosis Date Noted     Acute renal failure (H) 07/12/2012     Priority: Medium     Perirectal abscess 06/27/2012     Priority: Medium     CARDIOVASCULAR SCREENING; LDL GOAL LESS THAN 160 05/09/2010     Priority: Medium     External hemorrhoids with complication      Priority: Medium     Tobacco use disorder      Priority: Medium     Smokes 6 to 7 cigs/day. Started at 26 years         Medications:  Current Outpatient Medications   Medication Sig Dispense Refill     naproxen (NAPROSYN) 500 MG tablet Take 1 tablet (500 mg) by mouth 2 times daily (with meals) Until you no longer need it for pain (Patient not taking: Reported on 9/18/2020) 60 tablet 0     NO ACTIVE MEDICATIONS          Allergies:  Allergies   Allergen Reactions     Ivp Dye [Contrast Dye]        Family history:  Family History   Problem Relation Age of Onset     Diabetes Brother      Diabetes Brother      Anesthesia Reaction No family hx of      Deep Vein Thrombosis (DVT) No family hx of        Social history:  Social History     Tobacco Use     Smoking status: Light Tobacco Smoker     Types: Cigarettes     Smokeless tobacco: Never Used     Tobacco comment: 1 to 2  cigarettes/day. 2nd hand smoke exposure from roommates.   Substance Use Topics     Alcohol use: No     Marital status: single.  Occupation:     Nursing Notes:   Veronica Velazquez, EMT  9/18/2020  9:47 AM  Signed    Chief Complaint   Patient presents with     Surgical Followup     Date of surgery 8/28/2020       Vitals:    09/18/20 0942   BP: (!) 148/94   BP Location: Left arm   Patient Position: Sitting   Cuff Size: Adult Large   Pulse: 67   Temp:  "98.8  F (37.1  C)   TempSrc: Oral   SpO2: 100%   Weight: 226 lb 14.4 oz   Height: 5' 9\"       Body mass index is 33.51 kg/m .      DENIZ Hi                       Total face to face time was 15 minutes, >50% counseling.   This is a postop visit    MARIANGEL Bess, NP-C  Colon and Rectal Surgery  [facility]    This note was created using speech recognition software and may contain unintended word substitutions.      "

## 2020-09-18 NOTE — LETTER
"2020       RE: Herson Barlow  [address]  [facility] [address]     Dear Colleague,    Thank you for referring your patient, Herson Barlow, to the [facility] COLON AND RECTAL SURGERY at [facility]. Please see a copy of my visit note below.    Colon and Rectal Surgery Postoperative Clinic Note    RE: Herson Barlow  : 1975  MELISSA: 2020    Herson Barlow is a very pleasant 45 year old male with anal fistula now status post examination under anesthesia with fistulotomy and marsupialization, seton change, and flexible sigmoidoscopy on 20 with Dr. Moreland     Interval history: Herson has been doing well since his procedure.  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  No fevers or chills.  He states that he overall feels much better than he did before surgery he is having normal bowel movements and denies any leakage of stool or flatus.    Physical Examination: Exam was chaperoned by Veronica Monterroso, EMT  BP (!) 148/94 (BP Location: Left arm, Patient Position: Sitting, Cuff Size: Adult Large)   Pulse 67   Temp 98.8  F (37.1  C) (Oral)   Ht 5' 9\"   Wt 226 lb 14.4 oz   SpO2 100%   BMI 33.51 kg/m            General: Alert, oriented, in no acute distress, sitting comfortably  HEENT: Mucous membranes moist  Perianal external examination:  Yellow vessel loop seton present in the posterior position with fistulotomy site almost completely filled in.  Anterior to this there is an external opening along the prior scar line that probes approximately 4 cm towards the anal opening.  No purulent drainage but some bloody drainage present.    Digital rectal examination: Was deferred.    Anoscopy: Was deferred.    Assessment/Plan:  45 year old male status post examination under anesthesia with fistulotomy and marsupialization, seton " change, and flexible sigmoidoscopy on 8/28/20 with Dr. Moreland.  He has an additional external opening anterior to his seton a fistulotomy site.  This does not seem to connect to his current fistula tract but can be probed toward the anal opening.  No significant pain and no purulent drainage on exam today.  Will discuss with Dr. Moreland but I think he likely needs a repeat examination under anesthesia or repeat MRI to better characterize this. Patient's questions were answered to his stated satisfaction and he is in agreement with this plan.    Medical history:  Past Medical History:   Diagnosis Date     Acute renal failure (H) 7/12/2012     External hemorrhoids with complication      Tobacco use disorder     Smokes 1 to 2 cigs/day. Started at 26 years       Surgical history:  Past Surgical History:   Procedure Laterality Date     EXAM UNDER ANESTHESIA ANUS N/A 12/7/2018    Procedure: EXAM UNDER ANESTHESIA ANUS;  Surgeon: Bird Moreland MD;  [facility]     EXAM UNDER ANESTHESIA ANUS N/A 8/28/2020    Procedure: EXAM UNDER ANESTHESIA, ANUS;  Surgeon: Bird Moreland MD;  [facility]     FISTULOTOMY RECTUM  1/23/2013    Procedure: FISTULOTOMY RECTUM;  Rectal Exam, Fistulotomy, Seton Placement;  Surgeon: Bird Moreland MD;  [facility]     FISTULOTOMY RECTUM N/A 8/28/2020    Procedure: FISTULOTOMY, RECTUM;  Surgeon: Bird Moreland MD;  [facility]     HC HEMORRHOIDECTOMY,INT/EXT,COMPLX  9/21/09     HEMORRHOID SURGERY  2009     INCISION AND DRAINAGE PERINEAL, COMBINED  6/29/2012    Procedure: COMBINED INCISION AND DRAINAGE PERINEAL;  Incision and drainage of buttocks wound with wash out  of buttocks wound;  Surgeon: Jessica Olvera MD;  [facility]     INCISION AND DRAINAGE RECTUM, COMBINED  12/25/2012    Procedure: COMBINED INCISION AND DRAINAGE RECTUM;  Exam Under Anesthesia, drainage of deep post anal abcess;  Surgeon: Bird Moreland MD;  [facility]     IRRIGATION AND  DEBRIDEMENT RECTUM, COMBINED  6/27/2012    Procedure: COMBINED IRRIGATION AND DEBRIDEMENT RECTUM;  Irrigation and Debridement Rectal Abscess;  Surgeon: Jessica Olvera MD;  [facility]     PLACEMENT OF SETON RECTUM N/A 12/7/2018    Procedure: PLACEMENT OF SETON RECTUM;  Surgeon: Bird Moreland MD;  [facility]     PLACEMENT OF SETON RECTUM N/A 8/28/2020    Procedure: PLACEMENT, SETON STITCH;  Surgeon: Bird Moreland MD;  [facility]     SIGMOIDOSCOPY FLEXIBLE N/A 8/28/2020    Procedure: SIGMOIDOSCOPY, FLEXIBLE;  Surgeon: Bird Moreland MD;  [facility]       Problem list:  Patient Active Problem List    Diagnosis Date Noted     Acute renal failure (H) 07/12/2012     Priority: Medium     Perirectal abscess 06/27/2012     Priority: Medium     CARDIOVASCULAR SCREENING; LDL GOAL LESS THAN 160 05/09/2010     Priority: Medium     External hemorrhoids with complication      Priority: Medium     Tobacco use disorder      Priority: Medium     Smokes 6 to 7 cigs/day. Started at 26 years         Medications:  Current Outpatient Medications   Medication Sig Dispense Refill     naproxen (NAPROSYN) 500 MG tablet Take 1 tablet (500 mg) by mouth 2 times daily (with meals) Until you no longer need it for pain (Patient not taking: Reported on 9/18/2020) 60 tablet 0     NO ACTIVE MEDICATIONS          Allergies:  Allergies   Allergen Reactions     Ivp Dye [Contrast Dye]        Family history:  Family History   Problem Relation Age of Onset     Diabetes Brother      Diabetes Brother      Anesthesia Reaction No family hx of      Deep Vein Thrombosis (DVT) No family hx of        Social history:  Social History     Tobacco Use     Smoking status: Light Tobacco Smoker     Types: Cigarettes     Smokeless tobacco: Never Used     Tobacco comment: 1 to 2  cigarettes/day. 2nd hand smoke exposure from roommates.   Substance Use Topics     Alcohol use: No     Marital status: single.  Occupation: Truck  "    Nursing Notes:   Veronica Velazquez, EMT  9/18/2020  9:47 AM  Signed    Chief Complaint   Patient presents with     Surgical Followup     Date of surgery 8/28/2020       Vitals:    09/18/20 0942   BP: (!) 148/94   BP Location: Left arm   Patient Position: Sitting   Cuff Size: Adult Large   Pulse: 67   Temp: 98.8  F (37.1  C)   TempSrc: Oral   SpO2: 100%   Weight: 226 lb 14.4 oz   Height: 5' 9\"     Body mass index is 33.51 kg/m .    DENIZ Hi    Total face to face time was 15 minutes, >50% counseling.   This is a postop visit    This note was created using speech recognition software and may contain unintended word substitutions.    Again, thank you for allowing me to participate in the care of your patient.  Sincerely,    MARIANGEL Bess, NP-C  Colon and Rectal Surgery  [facility]  "

## 2020-10-19 NOTE — PROGRESS NOTES
Colon and Rectal Surgery Clinic Note      RE: Herson Barlow  : 1975  MELISSA: 10/20/2020    Herson Barlow is a very pleasant 45 year old male with anal fistula now status post examination under anesthesia with fistulotomy and marsupialization, seton change, and flexible sigmoidoscopy on 20. He saw Althea Grover NP in clinic on  with an additional external opening anterior to his seton a fistulotomy site.  This did not seem to connect to his current fistula tract but could be probed toward the anal opening.     He initially presented in 2012 with a  perianal abscess and associated necrotizing infection treated in 2 separate trips to the operating room by Dr. Jessica Olvera in 2012.  I brought him to the OR on 2012 with the following findings:  There was an I&D site present to the left of midline posterior to the anal canal where I attempted incision and drainage in the emergency room last night.  This was oozing a small quantity of pus.  We accessed the abscess cavity with a large bore needle and enlarged the opening and entered a large deep posterior abscess cavity.  This extended approximately 10 cm from the skin incision site to the most superior aspect of the abscess cavity, though I could not be confident that there was not further extension above this level.  We performed anoscopy and saw some scarring related to the patient's previous fistula surgery, but we saw no evident internal fistula opening.  We injected the cavity with hydrogen peroxide and saw no leakage in the anal canal.  I placed a left sided seton on 2013, and replaced it on 2018.  He returned to clinic on 2019 with a history of recurrent left sided perianal abscess leading to a return to the OR on 2019.    Interval History: Herson reports that he stopped having drainage about 2 days after he was in clinic. No swelling or pain. No current drainage. No difficulty  "with bowel movements. In the past, the setons have caused a lot of discomfort but his current seton is not causing any discomfort.    Physical examination:  Examination was chaperoned by Althea Grover NP.     Vitals: BP (!) 153/91 (BP Location: Left arm, Patient Position: Sitting, Cuff Size: Adult Regular)   Pulse 75   Temp 98.9  F (37.2  C) (Oral)   Ht 5' 9\"   Wt 227 lb 4.8 oz   SpO2 100%   BMI 33.57 kg/m    BMI= Body mass index is 33.57 kg/m .    Alert, oriented, in no acute distress. Seton in the posterior position with ties within the tract. Well healed scar in the left lateral position.    Laboratory data:    Recent Labs   Lab Test 08/26/20  1145 04/04/19  1945   WBC  --  16.0*   HGB 16.1 14.0   PLT  --  372   CR 1.06 0.92       Assessment/plan:  Fistulotomy site has healed well. He is tolerating the current seton well. We briefly discussed possible LIFT procedure to get rid of the seton. He is comfortable how he is right now and would prefer to wait longer before considering this. Given his complex and longstanding history of recurrent problems and past history of a necrotizing infection his likelihood of success post LIFT would certainly be lower than a more routine fistula.   He can follow up as needed if he would like to discuss further. Patient's questions were answered to his stated satisfaction and he is in agreement with this plan.    For details of past medical history, surgical history, family history, medications, allergies, and review of systems, please see details below.    Medical history:  Past Medical History:   Diagnosis Date     Acute renal failure (H) 7/12/2012     External hemorrhoids with complication      Tobacco use disorder     Smokes 1 to 2 cigs/day. Started at 26 years       Surgical history:  Past Surgical History:   Procedure Laterality Date     EXAM UNDER ANESTHESIA ANUS N/A 12/7/2018    Procedure: EXAM UNDER ANESTHESIA ANUS;  Surgeon: Bird Moreland MD;  " [facility]     EXAM UNDER ANESTHESIA ANUS N/A 8/28/2020    Procedure: EXAM UNDER ANESTHESIA, ANUS;  Surgeon: Bird Moreland MD;  [facility]     FISTULOTOMY RECTUM  1/23/2013    Procedure: FISTULOTOMY RECTUM;  Rectal Exam, Fistulotomy, Seton Placement;  Surgeon: Bird Moreland MD;  [facility]     FISTULOTOMY RECTUM N/A 8/28/2020    Procedure: FISTULOTOMY, RECTUM;  Surgeon: Bird Moreland MD;  [facility]     HC HEMORRHOIDECTOMY,INT/EXT,COMPLX  9/21/09     HEMORRHOID SURGERY  2009     INCISION AND DRAINAGE PERINEAL, COMBINED  6/29/2012    Procedure: COMBINED INCISION AND DRAINAGE PERINEAL;  Incision and drainage of buttocks wound with wash out  of buttocks wound;  Surgeon: Jessica Olvera MD;  [facility]     INCISION AND DRAINAGE RECTUM, COMBINED  12/25/2012    Procedure: COMBINED INCISION AND DRAINAGE RECTUM;  Exam Under Anesthesia, drainage of deep post anal abcess;  Surgeon: Bird Moreland MD;  [facility]     IRRIGATION AND DEBRIDEMENT RECTUM, COMBINED  6/27/2012    Procedure: COMBINED IRRIGATION AND DEBRIDEMENT RECTUM;  Irrigation and Debridement Rectal Abscess;  Surgeon: Jessica Olvera MD;  [facility]     PLACEMENT OF SETON RECTUM N/A 12/7/2018    Procedure: PLACEMENT OF SETON RECTUM;  Surgeon: Bird Moreland MD;  [facility]     PLACEMENT OF SETON RECTUM N/A 8/28/2020    Procedure: PLACEMENT, SETON STITCH;  Surgeon: Bird Moreland MD;  [facility]     SIGMOIDOSCOPY FLEXIBLE N/A 8/28/2020    Procedure: SIGMOIDOSCOPY, FLEXIBLE;  Surgeon: Bird Moreland MD;  [facility]       Family history:  Family History   Problem Relation Age of Onset     Diabetes Brother      Diabetes Brother      Anesthesia Reaction No family hx of      Deep Vein Thrombosis (DVT) No family hx of        Medications:  Current Outpatient Medications   Medication Sig Dispense Refill     naproxen (NAPROSYN) 500 MG tablet Take 1 tablet (500 mg) by mouth 2 times daily (with  "meals) Until you no longer need it for pain (Patient not taking: Reported on 9/18/2020) 60 tablet 0     NO ACTIVE MEDICATIONS          Allergies:  The patientis allergic to ivp dye [contrast dye].    Social history:  Social History     Tobacco Use     Smoking status: Light Tobacco Smoker     Types: Cigarettes     Smokeless tobacco: Never Used     Tobacco comment: 1 to 2  cigarettes/day. 2nd hand smoke exposure from roommates.   Substance Use Topics     Alcohol use: No     Marital status: single.    Review of Systems:  Nursing Notes:   Veronica Velazquez EMT  10/20/2020 12:20 PM  Signed  Chief Complaint   Patient presents with     RECHECK     Fistula.       Vitals:    10/20/20 1206   BP: (!) 153/91   BP Location: Left arm   Patient Position: Sitting   Cuff Size: Adult Regular   Pulse: 75   Temp: 98.9  F (37.2  C)   TempSrc: Oral   SpO2: 100%   Weight: 227 lb 4.8 oz   Height: 5' 9\"       Body mass index is 33.57 kg/m .      Veronica Monterroso, EMT                               Bird Moreland MD   Professor and Chief  Division of Colon and Rectal Surgery  [facility]      Referring Provider:  No referring provider defined for this encounter.     Primary Care Provider:  Clinic, [facility]    This note was created using speech recognition software and may contain unintended word substitutions.  "

## 2020-10-20 ENCOUNTER — OFFICE VISIT (OUTPATIENT)
Dept: SURGERY | Facility: CLINIC | Age: 45
End: 2020-10-20
Payer: COMMERCIAL

## 2020-10-20 VITALS
HEART RATE: 75 BPM | TEMPERATURE: 98.9 F | WEIGHT: 227.3 LBS | DIASTOLIC BLOOD PRESSURE: 91 MMHG | OXYGEN SATURATION: 100 % | HEIGHT: 69 IN | BODY MASS INDEX: 33.67 KG/M2 | SYSTOLIC BLOOD PRESSURE: 153 MMHG

## 2020-10-20 DIAGNOSIS — K60.30 ANAL FISTULA: Primary | ICD-10-CM

## 2020-10-20 PROCEDURE — 99024 POSTOP FOLLOW-UP VISIT: CPT | Performed by: COLON & RECTAL SURGERY

## 2020-10-20 ASSESSMENT — PAIN SCALES - GENERAL: PAINLEVEL: NO PAIN (0)

## 2020-10-20 ASSESSMENT — MIFFLIN-ST. JEOR: SCORE: 1911.41

## 2020-10-20 NOTE — NURSING NOTE
"Chief Complaint   Patient presents with     RECHECK     Fistula.       Vitals:    10/20/20 1206   BP: (!) 153/91   BP Location: Left arm   Patient Position: Sitting   Cuff Size: Adult Regular   Pulse: 75   Temp: 98.9  F (37.2  C)   TempSrc: Oral   SpO2: 100%   Weight: 227 lb 4.8 oz   Height: 5' 9\"       Body mass index is 33.57 kg/m .      Veronica Monterroso, EMT                      "

## 2020-10-20 NOTE — LETTER
10/20/2020       RE: Herson Barlow  [address]  [facility] [address]     Dear Colleague,    Thank you for referring your patient, Herson Barlow, to the [facility] COLON AND RECTAL SURGERY CLINIC [place] at [facility]. Please see a copy of my visit note below.    Colon and Rectal Surgery Clinic Note      RE: Herson Barlow  : 1975  MELISSA: 10/20/2020    Herson Barlow is a very pleasant 45 year old male with anal fistula now status post examination under anesthesia with fistulotomy and marsupialization, seton change, and flexible sigmoidoscopy on 20. He saw Althea Grover NP in clinic on  with an additional external opening anterior to his seton a fistulotomy site.  This did not seem to connect to his current fistula tract but could be probed toward the anal opening.     He initially presented in 2012 with a  perianal abscess and associated necrotizing infection treated in 2 separate trips to the operating room by Dr. Jessica Olvera in 2012.  I brought him to the OR on 2012 with the following findings:  There was an I&D site present to the left of midline posterior to the anal canal where I attempted incision and drainage in the emergency room last night.  This was oozing a small quantity of pus.  We accessed the abscess cavity with a large bore needle and enlarged the opening and entered a large deep posterior abscess cavity.  This extended approximately 10 cm from the skin incision site to the most superior aspect of the abscess cavity, though I could not be confident that there was not further extension above this level.  We performed anoscopy and saw some scarring related to the patient's previous fistula surgery, but we saw no evident internal fistula opening.  We injected the cavity with hydrogen peroxide and saw no leakage in the anal canal.  I placed a left sided seton on 2013, and replaced  "it on 12/7/2018.  He returned to clinic on 12/24/2019 with a history of recurrent left sided perianal abscess leading to a return to the OR on 12/28/2019.    Interval History: Herson reports that he stopped having drainage about 2 days after he was in clinic. No swelling or pain. No current drainage. No difficulty with bowel movements. In the past, the setons have caused a lot of discomfort but his current seton is not causing any discomfort.    Physical examination:  Examination was chaperoned by Althea Grover NP.     Vitals: BP (!) 153/91 (BP Location: Left arm, Patient Position: Sitting, Cuff Size: Adult Regular)   Pulse 75   Temp 98.9  F (37.2  C) (Oral)   Ht 5' 9\"   Wt 227 lb 4.8 oz   SpO2 100%   BMI 33.57 kg/m    BMI= Body mass index is 33.57 kg/m .    Alert, oriented, in no acute distress. Seton in the posterior position with ties within the tract. Well healed scar in the left lateral position.    Laboratory data:  Recent Labs   Lab Test 08/26/20  1145 04/04/19  1945   WBC  --  16.0*   HGB 16.1 14.0   PLT  --  372   CR 1.06 0.92     Assessment/plan:  Fistulotomy site has healed well. He is tolerating the current seton well. We briefly discussed possible LIFT procedure to get rid of the seton. He is comfortable how he is right now and would prefer to wait longer before considering this. Given his complex and longstanding history of recurrent problems and past history of a necrotizing infection his likelihood of success post LIFT would certainly be lower than a more routine fistula.   He can follow up as needed if he would like to discuss further. Patient's questions were answered to his stated satisfaction and he is in agreement with this plan.    For details of past medical history, surgical history, family history, medications, allergies, and review of systems, please see details below.    Medical history:  Past Medical History:   Diagnosis Date     Acute renal failure (H) 7/12/2012 "     External hemorrhoids with complication      Tobacco use disorder     Smokes 1 to 2 cigs/day. Started at 26 years     Surgical history:  Past Surgical History:   Procedure Laterality Date     EXAM UNDER ANESTHESIA ANUS N/A 12/7/2018    Procedure: EXAM UNDER ANESTHESIA ANUS;  Surgeon: Bird Moreland MD;  [facility]     EXAM UNDER ANESTHESIA ANUS N/A 8/28/2020    Procedure: EXAM UNDER ANESTHESIA, ANUS;  Surgeon: Bird Moreland MD;  [facility]     FISTULOTOMY RECTUM  1/23/2013    Procedure: FISTULOTOMY RECTUM;  Rectal Exam, Fistulotomy, Seton Placement;  Surgeon: Bird Moreland MD;  [facility]     FISTULOTOMY RECTUM N/A 8/28/2020    Procedure: FISTULOTOMY, RECTUM;  Surgeon: Bird Moreland MD;  [facility]     HC HEMORRHOIDECTOMY,INT/EXT,COMPLX  9/21/09     HEMORRHOID SURGERY  2009     INCISION AND DRAINAGE PERINEAL, COMBINED  6/29/2012    Procedure: COMBINED INCISION AND DRAINAGE PERINEAL;  Incision and drainage of buttocks wound with wash out  of buttocks wound;  Surgeon: Jessica Olvera MD;  [facility]     INCISION AND DRAINAGE RECTUM, COMBINED  12/25/2012    Procedure: COMBINED INCISION AND DRAINAGE RECTUM;  Exam Under Anesthesia, drainage of deep post anal abcess;  Surgeon: Bird Moreland MD;  [facility]     IRRIGATION AND DEBRIDEMENT RECTUM, COMBINED  6/27/2012    Procedure: COMBINED IRRIGATION AND DEBRIDEMENT RECTUM;  Irrigation and Debridement Rectal Abscess;  Surgeon: Jessica Olvera MD;  [facility]     PLACEMENT OF SETON RECTUM N/A 12/7/2018    Procedure: PLACEMENT OF SETON RECTUM;  Surgeon: Bird Moreland MD;  [facility]     PLACEMENT OF SETON RECTUM N/A 8/28/2020    Procedure: PLACEMENT, SETON STITCH;  Surgeon: Bird Moreland MD;  [facility]     SIGMOIDOSCOPY FLEXIBLE N/A 8/28/2020    Procedure: SIGMOIDOSCOPY, FLEXIBLE;  Surgeon: Bird Moreland MD;  [facility]     Family history:  Family History   Problem Relation Age of Onset  "    Diabetes Brother      Diabetes Brother      Anesthesia Reaction No family hx of      Deep Vein Thrombosis (DVT) No family hx of      Medications:  Current Outpatient Medications   Medication Sig Dispense Refill     naproxen (NAPROSYN) 500 MG tablet Take 1 tablet (500 mg) by mouth 2 times daily (with meals) Until you no longer need it for pain (Patient not taking: Reported on 9/18/2020) 60 tablet 0     NO ACTIVE MEDICATIONS        Allergies:  The patientis allergic to ivp dye [contrast dye].    Social history:  Social History     Tobacco Use     Smoking status: Light Tobacco Smoker     Types: Cigarettes     Smokeless tobacco: Never Used     Tobacco comment: 1 to 2  cigarettes/day. 2nd hand smoke exposure from roommates.   Substance Use Topics     Alcohol use: No     Marital status: single.    Review of Systems:  Nursing Notes:   Veronica Velazquez EMT  10/20/2020 12:20 PM  Signed  Chief Complaint   Patient presents with     RECHECK     Fistula.     Vitals:    10/20/20 1206   BP: (!) 153/91   BP Location: Left arm   Patient Position: Sitting   Cuff Size: Adult Regular   Pulse: 75   Temp: 98.9  F (37.2  C)   TempSrc: Oral   SpO2: 100%   Weight: 227 lb 4.8 oz   Height: 5' 9\"     Body mass index is 33.57 kg/m .    DENIZ Hi    This note was created using speech recognition software and may contain unintended word substitutions.    Again, thank you for allowing me to participate in the care of your patient.  Sincerely,    Bird Moreland MD   Professor and Chief  Division of Colon and Rectal Surgery  [facility]    Referring Provider:  No referring provider defined for this encounter.     Primary Care Provider:  [facility]"

## 2020-10-20 NOTE — LETTER
10/20/2020       RE: Herson Barlow  [address]  [facility] [address]     Dear Colleague,    Thank you for referring your patient, Herson Barlow, to the [facility] COLON AND RECTAL SURGERY CLINIC [place] at [facility]. Please see a copy of my visit note below.    Colon and Rectal Surgery Clinic Note      RE: Herson Barlow  : 1975  MELISSA: 10/20/2020    Herson Barlow is a very pleasant 45 year old male with anal fistula now status post examination under anesthesia with fistulotomy and marsupialization, seton change, and flexible sigmoidoscopy on 20. He saw Althea Grover NP in clinic on  with an additional external opening anterior to his seton a fistulotomy site.  This did not seem to connect to his current fistula tract but could be probed toward the anal opening.     He initially presented in 2012 with a  perianal abscess and associated necrotizing infection treated in 2 separate trips to the operating room by Dr. Jessica Olvera in 2012.  I brought him to the OR on 2012 with the following findings:  There was an I&D site present to the left of midline posterior to the anal canal where I attempted incision and drainage in the emergency room last night.  This was oozing a small quantity of pus.  We accessed the abscess cavity with a large bore needle and enlarged the opening and entered a large deep posterior abscess cavity.  This extended approximately 10 cm from the skin incision site to the most superior aspect of the abscess cavity, though I could not be confident that there was not further extension above this level.  We performed anoscopy and saw some scarring related to the patient's previous fistula surgery, but we saw no evident internal fistula opening.  We injected the cavity with hydrogen peroxide and saw no leakage in the anal canal.  I placed a left sided seton on 2013, and replaced  "it on 12/7/2018.  He returned to clinic on 12/24/2019 with a history of recurrent left sided perianal abscess leading to a return to the OR on 12/28/2019.    Interval History: Herson reports that he stopped having drainage about 2 days after he was in clinic. No swelling or pain. No current drainage. No difficulty with bowel movements. In the past, the setons have caused a lot of discomfort but his current seton is not causing any discomfort.    Physical examination:  Examination was chaperoned by Althea Grover NP.     Vitals: BP (!) 153/91 (BP Location: Left arm, Patient Position: Sitting, Cuff Size: Adult Regular)   Pulse 75   Temp 98.9  F (37.2  C) (Oral)   Ht 5' 9\"   Wt 227 lb 4.8 oz   SpO2 100%   BMI 33.57 kg/m    BMI= Body mass index is 33.57 kg/m .    Alert, oriented, in no acute distress. Seton in the posterior position with ties within the tract. Well healed scar in the left lateral position.    Laboratory data:    Recent Labs   Lab Test 08/26/20  1145 04/04/19  1945   WBC  --  16.0*   HGB 16.1 14.0   PLT  --  372   CR 1.06 0.92       Assessment/plan:  Fistulotomy site has healed well. He is tolerating the current seton well. We briefly discussed possible LIFT procedure to get rid of the seton. He is comfortable how he is right now and would prefer to wait longer before considering this. Given his complex and longstanding history of recurrent problems and past history of a necrotizing infection his likelihood of success post LIFT would certainly be lower than a more routine fistula.   He can follow up as needed if he would like to discuss further. Patient's questions were answered to his stated satisfaction and he is in agreement with this plan.    For details of past medical history, surgical history, family history, medications, allergies, and review of systems, please see details below.    Medical history:  Past Medical History:   Diagnosis Date     Acute renal failure (H) " 7/12/2012     External hemorrhoids with complication      Tobacco use disorder     Smokes 1 to 2 cigs/day. Started at 26 years       Surgical history:  Past Surgical History:   Procedure Laterality Date     EXAM UNDER ANESTHESIA ANUS N/A 12/7/2018    Procedure: EXAM UNDER ANESTHESIA ANUS;  Surgeon: Bird Moreland MD;  [facility]     EXAM UNDER ANESTHESIA ANUS N/A 8/28/2020    Procedure: EXAM UNDER ANESTHESIA, ANUS;  Surgeon: Bird Moreland MD;  [facility]     FISTULOTOMY RECTUM  1/23/2013    Procedure: FISTULOTOMY RECTUM;  Rectal Exam, Fistulotomy, Seton Placement;  Surgeon: Bird Moreland MD;  [facility]     FISTULOTOMY RECTUM N/A 8/28/2020    Procedure: FISTULOTOMY, RECTUM;  Surgeon: Bird Moreland MD;  [facility]     HC HEMORRHOIDECTOMY,INT/EXT,COMPLX  9/21/09     HEMORRHOID SURGERY  2009     INCISION AND DRAINAGE PERINEAL, COMBINED  6/29/2012    Procedure: COMBINED INCISION AND DRAINAGE PERINEAL;  Incision and drainage of buttocks wound with wash out  of buttocks wound;  Surgeon: Jessica Olvera MD;  [facility]     INCISION AND DRAINAGE RECTUM, COMBINED  12/25/2012    Procedure: COMBINED INCISION AND DRAINAGE RECTUM;  Exam Under Anesthesia, drainage of deep post anal abcess;  Surgeon: Bird Moreland MD;  [facility]     IRRIGATION AND DEBRIDEMENT RECTUM, COMBINED  6/27/2012    Procedure: COMBINED IRRIGATION AND DEBRIDEMENT RECTUM;  Irrigation and Debridement Rectal Abscess;  Surgeon: Jessica Olvera MD;  [facility]     PLACEMENT OF SETON RECTUM N/A 12/7/2018    Procedure: PLACEMENT OF SETON RECTUM;  Surgeon: Bird Moreland MD;  [facility]     PLACEMENT OF SETON RECTUM N/A 8/28/2020    Procedure: PLACEMENT, SETON STITCH;  Surgeon: Bird Moreland MD;  [facility]     SIGMOIDOSCOPY FLEXIBLE N/A 8/28/2020    Procedure: SIGMOIDOSCOPY, FLEXIBLE;  Surgeon: Bird Moreland MD;  [facility]       Family history:  Family History   Problem Relation  "Age of Onset     Diabetes Brother      Diabetes Brother      Anesthesia Reaction No family hx of      Deep Vein Thrombosis (DVT) No family hx of        Medications:  Current Outpatient Medications   Medication Sig Dispense Refill     naproxen (NAPROSYN) 500 MG tablet Take 1 tablet (500 mg) by mouth 2 times daily (with meals) Until you no longer need it for pain (Patient not taking: Reported on 9/18/2020) 60 tablet 0     NO ACTIVE MEDICATIONS          Allergies:  The patientis allergic to ivp dye [contrast dye].    Social history:  Social History     Tobacco Use     Smoking status: Light Tobacco Smoker     Types: Cigarettes     Smokeless tobacco: Never Used     Tobacco comment: 1 to 2  cigarettes/day. 2nd hand smoke exposure from roommates.   Substance Use Topics     Alcohol use: No     Marital status: single.    Review of Systems:  Nursing Notes:   Veronica Velazquez EMT  10/20/2020 12:20 PM  Signed  Chief Complaint   Patient presents with     RECHECK     Fistula.       Vitals:    10/20/20 1206   BP: (!) 153/91   BP Location: Left arm   Patient Position: Sitting   Cuff Size: Adult Regular   Pulse: 75   Temp: 98.9  F (37.2  C)   TempSrc: Oral   SpO2: 100%   Weight: 227 lb 4.8 oz   Height: 5' 9\"       Body mass index is 33.57 kg/m .      Veronica Monterroso, EMT                               Bird Moreland MD   Professor and Chief  Division of Colon and Rectal Surgery  [facility]      Referring Provider:  No referring provider defined for this encounter.     Primary Care Provider:  Clinic, [facility]    This note was created using speech recognition software and may contain unintended word substitutions.      Again, thank you for allowing me to participate in the care of your patient.      Sincerely,    Bird Moreland MD      "

## 2020-12-06 ENCOUNTER — HEALTH MAINTENANCE LETTER (OUTPATIENT)
Age: 45
End: 2020-12-06

## 2021-09-25 ENCOUNTER — HEALTH MAINTENANCE LETTER (OUTPATIENT)
Age: 46
End: 2021-09-25

## 2022-01-15 ENCOUNTER — HEALTH MAINTENANCE LETTER (OUTPATIENT)
Age: 47
End: 2022-01-15

## 2022-05-16 NOTE — PROGRESS NOTES
a. Does the patient take five or more prescription medications? No  b. Does patient have difficulty walking up two flights of stairs? No  c. Is patient s BMI 35 or greater? No  d. Is patient an insulin dependent diabetic? No  e. Does patient have a history of having a heart attack or a heart surgery of any kind? No  f. Does patient have a history of heart failure? No  g. Does the patient have a history of any type of transplant? No  h. Does the patient use inhalers on a daily basis? No  i. Does the patient have a history of pulmonary hypertension? No  Once the patient is evaluated by PAC contact (ex: Surgery schedulers name and number, RN's name and number, etc..);    Jenn Galindo RNCC- Colon and Rectal Surgery    Name of planned surgery: examination under anesthesia and seton placement     +L thumb paronychia No respiratory distress. No stridor, Lungs sounds clear with good aeration bilaterally.

## 2022-11-06 NOTE — TELEPHONE ENCOUNTER
Patient returned call, states since surgery he has been doing well, he had pain the first few days after the procedure, but that is to be expected. Patient states now he is not having any pain, slight pain when having a bowel movement, but otherwise doing well. Having daily drainage where seton is place. Patient is out of town, but will still like to make a follow up appt for check. Appt set up with Althea Grover NP on 1/31/19 @ 8:45am. Confirmed date/time, patient will call if he has any questions prior to appt.    Ruben HAYDEN LPN     No

## 2023-01-07 ENCOUNTER — HEALTH MAINTENANCE LETTER (OUTPATIENT)
Age: 48
End: 2023-01-07

## 2023-04-22 ENCOUNTER — HEALTH MAINTENANCE LETTER (OUTPATIENT)
Age: 48
End: 2023-04-22

## (undated) DEVICE — LINEN TOWEL PACK X5 5464

## (undated) DEVICE — SU SILK 0 TIE 6X30" A306H

## (undated) DEVICE — ENDO CAP AND TUBING STERILE FOR ENDOGATOR  100130

## (undated) DEVICE — ANOSCOPE PLASTIC CLEAR UNSTERILE 82420

## (undated) DEVICE — SOL WATER IRRIG 1000ML BOTTLE 2F7114

## (undated) DEVICE — DRSG TELFA 3X8" 1238

## (undated) DEVICE — SOL WATER IRRIG 500ML BOTTLE 2F7113

## (undated) DEVICE — TAPE DURAPORE 3" SILK 1538-3

## (undated) DEVICE — PAD CHUX UNDERPAD 30X30"

## (undated) DEVICE — SU SILK 2-0 SH 30" K833H

## (undated) DEVICE — JELLY LUBRICATING SURGILUBE 2OZ TUBE

## (undated) DEVICE — PREP TECHNI-CARE CHLOROXYLENOL 3% 4OZ BOTTLE C222-4ZWO

## (undated) DEVICE — SU MONOCRYL 3-0 SH 27" UND Y416H

## (undated) DEVICE — GLOVE PROTEXIS BLUE W/NEU-THERA 7.5  2D73EB75

## (undated) DEVICE — SWAB PROCTO 16" 2/PK 32-046

## (undated) DEVICE — GLOVE PROTEXIS MICRO 7.0  2D73PM70

## (undated) DEVICE — SU SILK 2-0 TIE 12X30" A305H

## (undated) DEVICE — ESU GROUND PAD ADULT W/CORD E7507

## (undated) DEVICE — VESSEL LOOPS YELLOW MAXI 31145694

## (undated) DEVICE — TUBING SMOKE EVAC 2.2CMX3M SEA3715

## (undated) DEVICE — GOWN ISOLATION YELLOW XL NOT STERILE 3111PG

## (undated) DEVICE — ESU ELEC BLADE 6" COATED E1450-6

## (undated) DEVICE — DRSG GAUZE 4X4" TRAY 6939

## (undated) DEVICE — GLOVE PROTEXIS W/NEU-THERA 7.0  2D73TE70

## (undated) DEVICE — SUCTION MANIFOLD NEPTUNE 2 SYS 1 PORT 702-025-000

## (undated) DEVICE — WIPE PREMOIST CLEANSING WASHCLOTHS 7988

## (undated) RX ORDER — KETOROLAC TROMETHAMINE 30 MG/ML
INJECTION, SOLUTION INTRAMUSCULAR; INTRAVENOUS
Status: DISPENSED
Start: 2018-12-07

## (undated) RX ORDER — ACETAMINOPHEN 325 MG/1
TABLET ORAL
Status: DISPENSED
Start: 2018-12-07

## (undated) RX ORDER — EPINEPHRINE 1 MG/ML
INJECTION, SOLUTION, CONCENTRATE INTRAVENOUS
Status: DISPENSED
Start: 2020-08-28

## (undated) RX ORDER — FENTANYL CITRATE 50 UG/ML
INJECTION, SOLUTION INTRAMUSCULAR; INTRAVENOUS
Status: DISPENSED
Start: 2018-12-07

## (undated) RX ORDER — GABAPENTIN 300 MG/1
CAPSULE ORAL
Status: DISPENSED
Start: 2020-08-28

## (undated) RX ORDER — GABAPENTIN 300 MG/1
CAPSULE ORAL
Status: DISPENSED
Start: 2018-12-07

## (undated) RX ORDER — GLYCOPYRROLATE 0.2 MG/ML
INJECTION INTRAMUSCULAR; INTRAVENOUS
Status: DISPENSED
Start: 2018-12-07

## (undated) RX ORDER — BUPIVACAINE HYDROCHLORIDE 2.5 MG/ML
INJECTION, SOLUTION EPIDURAL; INFILTRATION; INTRACAUDAL
Status: DISPENSED
Start: 2020-08-28

## (undated) RX ORDER — PROPOFOL 10 MG/ML
INJECTION, EMULSION INTRAVENOUS
Status: DISPENSED
Start: 2020-08-28

## (undated) RX ORDER — FENTANYL CITRATE 50 UG/ML
INJECTION, SOLUTION INTRAMUSCULAR; INTRAVENOUS
Status: DISPENSED
Start: 2020-08-28

## (undated) RX ORDER — LIDOCAINE HYDROCHLORIDE 20 MG/ML
INJECTION, SOLUTION EPIDURAL; INFILTRATION; INTRACAUDAL; PERINEURAL
Status: DISPENSED
Start: 2020-08-28

## (undated) RX ORDER — BUPIVACAINE HYDROCHLORIDE 2.5 MG/ML
INJECTION, SOLUTION INFILTRATION; PERINEURAL
Status: DISPENSED
Start: 2018-12-07

## (undated) RX ORDER — EPINEPHRINE 1 MG/ML
INJECTION, SOLUTION, CONCENTRATE INTRAVENOUS
Status: DISPENSED
Start: 2018-12-07

## (undated) RX ORDER — ACETAMINOPHEN 325 MG/1
TABLET ORAL
Status: DISPENSED
Start: 2020-08-28